# Patient Record
Sex: FEMALE | Race: WHITE | HISPANIC OR LATINO | Employment: OTHER | URBAN - METROPOLITAN AREA
[De-identification: names, ages, dates, MRNs, and addresses within clinical notes are randomized per-mention and may not be internally consistent; named-entity substitution may affect disease eponyms.]

---

## 2017-01-28 ENCOUNTER — HOSPITAL ENCOUNTER (EMERGENCY)
Facility: HOSPITAL | Age: 32
Discharge: HOME | End: 2017-01-28
Admitting: EMERGENCY MEDICINE

## 2017-01-28 ENCOUNTER — APPOINTMENT (EMERGENCY)
Dept: RADIOLOGY | Facility: HOSPITAL | Age: 32
End: 2017-01-28

## 2017-01-28 VITALS
DIASTOLIC BLOOD PRESSURE: 55 MMHG | RESPIRATION RATE: 17 BRPM | BODY MASS INDEX: 19.54 KG/M2 | SYSTOLIC BLOOD PRESSURE: 114 MMHG | HEART RATE: 103 BPM | OXYGEN SATURATION: 96 % | WEIGHT: 128.53 LBS | TEMPERATURE: 98.2 F

## 2017-01-28 DIAGNOSIS — B34.9 VIRAL SYNDROME: ICD-10-CM

## 2017-01-28 DIAGNOSIS — N39.0 UTI (URINARY TRACT INFECTION): Primary | ICD-10-CM

## 2017-01-28 LAB
BACTERIA UR QL AUTO: ABNORMAL /HPF
BILIRUB UR QL STRIP: ABNORMAL
CLARITY UR: ABNORMAL
COLOR UR: ABNORMAL
FLUAV AG SPEC QL IA: NEGATIVE
FLUBV AG SPEC QL IA: NEGATIVE
GLUCOSE UR STRIP-MCNC: NEGATIVE MG/DL
HCG UR QL: NEGATIVE
HGB UR QL STRIP.AUTO: ABNORMAL
KETONES UR STRIP-MCNC: ABNORMAL MG/DL
LEUKOCYTE ESTERASE UR QL STRIP: NEGATIVE
NITRITE UR QL STRIP: POSITIVE
NON-SQ EPI CELLS URNS QL MICRO: ABNORMAL /HPF
PH UR STRIP.AUTO: 5.5 [PH] (ref 4.5–8)
PROT UR STRIP-MCNC: ABNORMAL MG/DL
RBC #/AREA URNS AUTO: ABNORMAL /HPF
SP GR UR STRIP.AUTO: >=1.03 (ref 1–1.03)
UROBILINOGEN UR QL STRIP.AUTO: 1 E.U./DL
WBC #/AREA URNS AUTO: ABNORMAL /HPF

## 2017-01-28 PROCEDURE — 87798 DETECT AGENT NOS DNA AMP: CPT | Performed by: PHYSICIAN ASSISTANT

## 2017-01-28 PROCEDURE — 71020 HB CHEST X-RAY 2VW FRONTAL&LATL: CPT

## 2017-01-28 PROCEDURE — 87400 INFLUENZA A/B EACH AG IA: CPT | Performed by: PHYSICIAN ASSISTANT

## 2017-01-28 PROCEDURE — 87077 CULTURE AEROBIC IDENTIFY: CPT

## 2017-01-28 PROCEDURE — 87086 URINE CULTURE/COLONY COUNT: CPT

## 2017-01-28 PROCEDURE — 99283 EMERGENCY DEPT VISIT LOW MDM: CPT

## 2017-01-28 PROCEDURE — 87186 SC STD MICRODIL/AGAR DIL: CPT

## 2017-01-28 PROCEDURE — 81001 URINALYSIS AUTO W/SCOPE: CPT

## 2017-01-28 PROCEDURE — 81025 URINE PREGNANCY TEST: CPT | Performed by: PHYSICIAN ASSISTANT

## 2017-01-28 RX ORDER — CEPHALEXIN 500 MG/1
500 CAPSULE ORAL 3 TIMES DAILY
Qty: 15 CAPSULE | Refills: 0 | Status: SHIPPED | OUTPATIENT
Start: 2017-01-28 | End: 2017-02-02

## 2017-01-29 LAB
FLUAV AG SPEC QL: NORMAL
FLUBV AG SPEC QL: NORMAL
RSV B RNA SPEC QL NAA+PROBE: NORMAL

## 2017-01-30 LAB — BACTERIA UR CULT: NORMAL

## 2017-04-10 ENCOUNTER — APPOINTMENT (EMERGENCY)
Dept: CT IMAGING | Facility: HOSPITAL | Age: 32
End: 2017-04-10
Payer: COMMERCIAL

## 2017-04-10 ENCOUNTER — HOSPITAL ENCOUNTER (EMERGENCY)
Facility: HOSPITAL | Age: 32
Discharge: HOME | End: 2017-04-10
Attending: EMERGENCY MEDICINE
Payer: COMMERCIAL

## 2017-04-10 VITALS
BODY MASS INDEX: 18.64 KG/M2 | WEIGHT: 122.6 LBS | TEMPERATURE: 98.4 F | HEART RATE: 78 BPM | OXYGEN SATURATION: 99 % | DIASTOLIC BLOOD PRESSURE: 60 MMHG | RESPIRATION RATE: 16 BRPM | SYSTOLIC BLOOD PRESSURE: 103 MMHG

## 2017-04-10 DIAGNOSIS — R10.9 ABDOMINAL PAIN: Primary | ICD-10-CM

## 2017-04-10 LAB
BACTERIA UR QL AUTO: NORMAL /HPF
BILIRUB UR QL STRIP: ABNORMAL
CLARITY UR: ABNORMAL
COLOR UR: YELLOW
GLUCOSE UR STRIP-MCNC: NEGATIVE MG/DL
HCG UR QL: NEGATIVE
HGB UR QL STRIP.AUTO: NEGATIVE
KETONES UR STRIP-MCNC: NEGATIVE MG/DL
LEUKOCYTE ESTERASE UR QL STRIP: NEGATIVE
MUCOUS THREADS UR QL AUTO: NORMAL
NITRITE UR QL STRIP: NEGATIVE
NON-SQ EPI CELLS URNS QL MICRO: NORMAL /HPF
PH UR STRIP.AUTO: 8.5 [PH] (ref 4.5–8)
PROT UR STRIP-MCNC: ABNORMAL MG/DL
RBC #/AREA URNS AUTO: NORMAL /HPF
SP GR UR STRIP.AUTO: 1.02 (ref 1–1.03)
UROBILINOGEN UR QL STRIP.AUTO: 1 E.U./DL
WBC #/AREA URNS AUTO: NORMAL /HPF

## 2017-04-10 PROCEDURE — 81002 URINALYSIS NONAUTO W/O SCOPE: CPT | Performed by: EMERGENCY MEDICINE

## 2017-04-10 PROCEDURE — 81025 URINE PREGNANCY TEST: CPT | Performed by: EMERGENCY MEDICINE

## 2017-04-10 PROCEDURE — 99284 EMERGENCY DEPT VISIT MOD MDM: CPT

## 2017-04-10 PROCEDURE — 81001 URINALYSIS AUTO W/SCOPE: CPT

## 2017-04-10 RX ORDER — KETOROLAC TROMETHAMINE 30 MG/ML
15 INJECTION, SOLUTION INTRAMUSCULAR; INTRAVENOUS ONCE
Status: DISCONTINUED | OUTPATIENT
Start: 2017-04-10 | End: 2017-04-10 | Stop reason: HOSPADM

## 2017-10-18 ENCOUNTER — TRANSCRIBE ORDERS (OUTPATIENT)
Dept: LAB | Facility: CLINIC | Age: 32
End: 2017-10-18

## 2017-10-18 ENCOUNTER — GENERIC CONVERSION - ENCOUNTER (OUTPATIENT)
Dept: OTHER | Facility: OTHER | Age: 32
End: 2017-10-18

## 2017-10-18 ENCOUNTER — APPOINTMENT (OUTPATIENT)
Dept: LAB | Facility: CLINIC | Age: 32
End: 2017-10-18
Payer: COMMERCIAL

## 2017-10-18 DIAGNOSIS — N96 RECURRENT PREGNANCY LOSS (CODE): ICD-10-CM

## 2017-10-18 DIAGNOSIS — R53.82 CHRONIC FATIGUE: ICD-10-CM

## 2017-10-18 DIAGNOSIS — R63.4 ABNORMAL WEIGHT LOSS: ICD-10-CM

## 2017-10-18 DIAGNOSIS — Z34.91 ENCOUNTER FOR SUPERVISION OF NORMAL PREGNANCY IN FIRST TRIMESTER: ICD-10-CM

## 2017-10-18 LAB
25(OH)D3 SERPL-MCNC: 19.2 NG/ML (ref 30–100)
ALBUMIN SERPL BCP-MCNC: 4 G/DL (ref 3.5–5)
ALP SERPL-CCNC: 58 U/L (ref 46–116)
ALT SERPL W P-5'-P-CCNC: 17 U/L (ref 12–78)
ANION GAP SERPL CALCULATED.3IONS-SCNC: 7 MMOL/L (ref 4–13)
AST SERPL W P-5'-P-CCNC: 13 U/L (ref 5–45)
BASOPHILS # BLD AUTO: 0.02 THOUSANDS/ΜL (ref 0–0.1)
BASOPHILS NFR BLD AUTO: 0 % (ref 0–1)
BILIRUB SERPL-MCNC: 0.64 MG/DL (ref 0.2–1)
BUN SERPL-MCNC: 8 MG/DL (ref 5–25)
CALCIUM SERPL-MCNC: 9 MG/DL (ref 8.3–10.1)
CHLORIDE SERPL-SCNC: 104 MMOL/L (ref 100–108)
CHOLEST SERPL-MCNC: 139 MG/DL (ref 50–200)
CO2 SERPL-SCNC: 26 MMOL/L (ref 21–32)
CREAT SERPL-MCNC: 0.63 MG/DL (ref 0.6–1.3)
CREAT UR-MCNC: 197 MG/DL
EOSINOPHIL # BLD AUTO: 0.02 THOUSAND/ΜL (ref 0–0.61)
EOSINOPHIL NFR BLD AUTO: 0 % (ref 0–6)
ERYTHROCYTE [DISTWIDTH] IN BLOOD BY AUTOMATED COUNT: 13.3 % (ref 11.6–15.1)
GFR SERPL CREATININE-BSD FRML MDRD: 121 ML/MIN/1.73SQ M
GLUCOSE P FAST SERPL-MCNC: 89 MG/DL (ref 65–99)
HCT VFR BLD AUTO: 41 % (ref 34.8–46.1)
HDLC SERPL-MCNC: 63 MG/DL (ref 40–60)
HGB BLD-MCNC: 13.9 G/DL (ref 11.5–15.4)
LDLC SERPL CALC-MCNC: 62 MG/DL (ref 0–100)
LYMPHOCYTES # BLD AUTO: 1.11 THOUSANDS/ΜL (ref 0.6–4.47)
LYMPHOCYTES NFR BLD AUTO: 14 % (ref 14–44)
MCH RBC QN AUTO: 31.2 PG (ref 26.8–34.3)
MCHC RBC AUTO-ENTMCNC: 33.9 G/DL (ref 31.4–37.4)
MCV RBC AUTO: 92 FL (ref 82–98)
MICROALBUMIN UR-MCNC: 46.7 MG/L (ref 0–20)
MICROALBUMIN/CREAT 24H UR: 24 MG/G CREATININE (ref 0–30)
MONOCYTES # BLD AUTO: 0.55 THOUSAND/ΜL (ref 0.17–1.22)
MONOCYTES NFR BLD AUTO: 7 % (ref 4–12)
NEUTROPHILS # BLD AUTO: 6.17 THOUSANDS/ΜL (ref 1.85–7.62)
NEUTS SEG NFR BLD AUTO: 79 % (ref 43–75)
NRBC BLD AUTO-RTO: 0 /100 WBCS
PLATELET # BLD AUTO: 250 THOUSANDS/UL (ref 149–390)
PMV BLD AUTO: 10.4 FL (ref 8.9–12.7)
POTASSIUM SERPL-SCNC: 3.9 MMOL/L (ref 3.5–5.3)
PROT SERPL-MCNC: 8.3 G/DL (ref 6.4–8.2)
RBC # BLD AUTO: 4.46 MILLION/UL (ref 3.81–5.12)
SODIUM SERPL-SCNC: 137 MMOL/L (ref 136–145)
TRIGL SERPL-MCNC: 68 MG/DL
TSH SERPL DL<=0.05 MIU/L-ACNC: 0.55 UIU/ML (ref 0.36–3.74)
WBC # BLD AUTO: 7.88 THOUSAND/UL (ref 4.31–10.16)

## 2017-10-18 PROCEDURE — 82570 ASSAY OF URINE CREATININE: CPT

## 2017-10-18 PROCEDURE — 81241 F5 GENE: CPT

## 2017-10-18 PROCEDURE — 80053 COMPREHEN METABOLIC PANEL: CPT

## 2017-10-18 PROCEDURE — 82306 VITAMIN D 25 HYDROXY: CPT

## 2017-10-18 PROCEDURE — 36415 COLL VENOUS BLD VENIPUNCTURE: CPT

## 2017-10-18 PROCEDURE — 80061 LIPID PANEL: CPT

## 2017-10-18 PROCEDURE — 86430 RHEUMATOID FACTOR TEST QUAL: CPT

## 2017-10-18 PROCEDURE — 86038 ANTINUCLEAR ANTIBODIES: CPT

## 2017-10-18 PROCEDURE — 82043 UR ALBUMIN QUANTITATIVE: CPT

## 2017-10-18 PROCEDURE — 84443 ASSAY THYROID STIM HORMONE: CPT

## 2017-10-18 PROCEDURE — 85025 COMPLETE CBC W/AUTO DIFF WBC: CPT

## 2017-10-19 ENCOUNTER — GENERIC CONVERSION - ENCOUNTER (OUTPATIENT)
Dept: OTHER | Facility: OTHER | Age: 32
End: 2017-10-19

## 2017-10-19 LAB — RHEUMATOID FACT SER QL LA: NEGATIVE

## 2017-10-20 LAB — RYE IGE QN: NEGATIVE

## 2017-10-24 LAB
COMMENT: NORMAL
F5 GENE MUT ANL BLD/T: NORMAL

## 2017-11-07 ENCOUNTER — ALLSCRIPTS OFFICE VISIT (OUTPATIENT)
Dept: OTHER | Facility: OTHER | Age: 32
End: 2017-11-07

## 2017-11-07 ENCOUNTER — GENERIC CONVERSION - ENCOUNTER (OUTPATIENT)
Dept: OTHER | Facility: OTHER | Age: 32
End: 2017-11-07

## 2017-11-07 ENCOUNTER — APPOINTMENT (OUTPATIENT)
Dept: LAB | Facility: HOSPITAL | Age: 32
End: 2017-11-07
Payer: COMMERCIAL

## 2017-11-07 DIAGNOSIS — Z34.91 ENCOUNTER FOR SUPERVISION OF NORMAL PREGNANCY IN FIRST TRIMESTER: ICD-10-CM

## 2017-11-07 LAB
ABO GROUP BLD: NORMAL
BACTERIA UR QL AUTO: ABNORMAL /HPF
BASOPHILS # BLD AUTO: 0.02 THOUSANDS/ΜL (ref 0–0.1)
BASOPHILS NFR BLD AUTO: 0 % (ref 0–1)
BILIRUB UR QL STRIP: NEGATIVE
BLD GP AB SCN SERPL QL: NEGATIVE
CLARITY UR: CLEAR
COLOR UR: YELLOW
EOSINOPHIL # BLD AUTO: 0.02 THOUSAND/ΜL (ref 0–0.61)
EOSINOPHIL NFR BLD AUTO: 0 % (ref 0–6)
ERYTHROCYTE [DISTWIDTH] IN BLOOD BY AUTOMATED COUNT: 12.6 % (ref 11.6–15.1)
GLUCOSE UR STRIP-MCNC: NEGATIVE MG/DL
HCT VFR BLD AUTO: 39.1 % (ref 34.8–46.1)
HGB BLD-MCNC: 13.6 G/DL (ref 11.5–15.4)
HGB UR QL STRIP.AUTO: NEGATIVE
KETONES UR STRIP-MCNC: NEGATIVE MG/DL
LEUKOCYTE ESTERASE UR QL STRIP: NEGATIVE
LYMPHOCYTES # BLD AUTO: 1.68 THOUSANDS/ΜL (ref 0.6–4.47)
LYMPHOCYTES NFR BLD AUTO: 20 % (ref 14–44)
MCH RBC QN AUTO: 31.3 PG (ref 26.8–34.3)
MCHC RBC AUTO-ENTMCNC: 34.8 G/DL (ref 31.4–37.4)
MCV RBC AUTO: 90 FL (ref 82–98)
MONOCYTES # BLD AUTO: 0.7 THOUSAND/ΜL (ref 0.17–1.22)
MONOCYTES NFR BLD AUTO: 8 % (ref 4–12)
NEUTROPHILS # BLD AUTO: 5.89 THOUSANDS/ΜL (ref 1.85–7.62)
NEUTS SEG NFR BLD AUTO: 72 % (ref 43–75)
NITRITE UR QL STRIP: NEGATIVE
NON-SQ EPI CELLS URNS QL MICRO: ABNORMAL /HPF
NRBC BLD AUTO-RTO: 0 /100 WBCS
PH UR STRIP.AUTO: 6 [PH] (ref 4.5–8)
PLATELET # BLD AUTO: 214 THOUSANDS/UL (ref 149–390)
PMV BLD AUTO: 10.3 FL (ref 8.9–12.7)
PROT UR STRIP-MCNC: ABNORMAL MG/DL
RBC # BLD AUTO: 4.34 MILLION/UL (ref 3.81–5.12)
RBC #/AREA URNS AUTO: ABNORMAL /HPF
RH BLD: POSITIVE
SP GR UR STRIP.AUTO: >=1.03 (ref 1–1.03)
SPECIMEN EXPIRATION DATE: NORMAL
UROBILINOGEN UR QL STRIP.AUTO: 0.2 E.U./DL
WBC # BLD AUTO: 8.31 THOUSAND/UL (ref 4.31–10.16)
WBC #/AREA URNS AUTO: ABNORMAL /HPF

## 2017-11-07 PROCEDURE — 87086 URINE CULTURE/COLONY COUNT: CPT

## 2017-11-07 PROCEDURE — 87077 CULTURE AEROBIC IDENTIFY: CPT

## 2017-11-07 PROCEDURE — 81220 CFTR GENE COM VARIANTS: CPT

## 2017-11-07 PROCEDURE — 81401 MOPATH PROCEDURE LEVEL 2: CPT

## 2017-11-07 PROCEDURE — 87186 SC STD MICRODIL/AGAR DIL: CPT

## 2017-11-07 PROCEDURE — 36415 COLL VENOUS BLD VENIPUNCTURE: CPT

## 2017-11-07 PROCEDURE — 81001 URINALYSIS AUTO W/SCOPE: CPT

## 2017-11-07 PROCEDURE — 80081 OBSTETRIC PANEL INC HIV TSTG: CPT

## 2017-11-08 ENCOUNTER — GENERIC CONVERSION - ENCOUNTER (OUTPATIENT)
Dept: OTHER | Facility: OTHER | Age: 32
End: 2017-11-08

## 2017-11-08 LAB
HBV SURFACE AG SER QL: NORMAL
HIV 1+2 AB+HIV1 P24 AG SERPL QL IA: NORMAL
RPR SER QL: NORMAL
RUBV IGG SERPL IA-ACNC: 53.1 IU/ML

## 2017-11-10 ENCOUNTER — GENERIC CONVERSION - ENCOUNTER (OUTPATIENT)
Dept: OTHER | Facility: OTHER | Age: 32
End: 2017-11-10

## 2017-11-10 LAB
BACTERIA UR CULT: ABNORMAL
BACTERIA UR CULT: ABNORMAL

## 2017-11-13 ENCOUNTER — GENERIC CONVERSION - ENCOUNTER (OUTPATIENT)
Dept: OTHER | Facility: OTHER | Age: 32
End: 2017-11-13

## 2017-11-14 LAB
CF COMMENT: NORMAL
CFTR MUT ANL BLD/T: NORMAL

## 2017-11-19 LAB — MISCELLANEOUS LAB TEST RESULT: NORMAL

## 2017-11-20 ENCOUNTER — GENERIC CONVERSION - ENCOUNTER (OUTPATIENT)
Dept: OTHER | Facility: OTHER | Age: 32
End: 2017-11-20

## 2018-01-10 NOTE — RESULT NOTES
Discussion/Summary   please let pt know BW shows her thyroid is normal  In general her BW looks good  Her Vitamin D is low at 19 should be at least 30 ideally closer to 60  Take Vitamin D 2000 IU daily sent to pharmacy      Verified Results  (1) CBC/PLT/DIFF [id number] 11:39AM Bunny Been Order Number: [id number]     Test Name Result Flag Reference   WBC COUNT 7 88 Thousand/uL  4 31-10 16   RBC COUNT 4 46 Million/uL  3 81-5 12   HEMOGLOBIN 13 9 g/dL  11 5-15 4   HEMATOCRIT 41 0 %  34 8-46  1   MCV 92 fL  82-98   MCH 31 2 pg  26 8-34 3   MCHC 33 9 g/dL  31 4-37 4   RDW 13 3 %  11 6-15 1   MPV 10 4 fL  8 9-12 7   PLATELET COUNT 478 Thousands/uL  149-390   nRBC AUTOMATED 0 /100 WBCs     NEUTROPHILS RELATIVE PERCENT 79 % H 43-75   LYMPHOCYTES RELATIVE PERCENT 14 %  14-44   MONOCYTES RELATIVE PERCENT 7 %  4-12   EOSINOPHILS RELATIVE PERCENT 0 %  0-6   BASOPHILS RELATIVE PERCENT 0 %  0-1   NEUTROPHILS ABSOLUTE COUNT 6 17 Thousands/? ??L  1 85-7 62   LYMPHOCYTES ABSOLUTE COUNT 1 11 Thousands/? ??L  0 60-4 47   MONOCYTES ABSOLUTE COUNT 0 55 Thousand/? ??L  0 17-1 22   EOSINOPHILS ABSOLUTE COUNT 0 02 Thousand/? ??L  0 00-0 61   BASOPHILS ABSOLUTE COUNT 0 02 Thousands/? ??L  0 00-0 10     (1) COMPREHENSIVE METABOLIC PANEL [id number] 85:40TY Phil Liner    Order Number: [id number]     Test Name Result Flag Reference   SODIUM 137 mmol/L  136-145   POTASSIUM 3 9 mmol/L  3 5-5 3   CHLORIDE 104 mmol/L  100-108   CARBON DIOXIDE 26 mmol/L  21-32   ANION GAP (CALC) 7 mmol/L  4-13   BLOOD UREA NITROGEN 8 mg/dL  5-25   CREATININE 0 63 mg/dL  0 60-1 30   Standardized to IDMS reference method   CALCIUM 9 0 mg/dL  8 3-10 1   BILI, TOTAL 0 64 mg/dL  0 20-1 00   ALK PHOSPHATAS 58 U/L     ALT (SGPT) 17 U/L  12-78   Specimen collection should occur prior to Sulfasalazine and/or Sulfapyridine administration due to the potential for falsely depressed results     AST(SGOT) 13 U/L  5-45   Specimen collection should occur prior to Sulfasalazine administration due to the potential for falsely depressed results  ALBUMIN 4 0 g/dL  3 5-5 0   TOTAL PROTEIN 8 3 g/dL H 6 4-8 2   eGFR 121 ml/min/1 73sq m     National Kidney Disease Education Program recommendations are as follows:  GFR calculation is accurate only with a steady state creatinine  Chronic Kidney disease less than 60 ml/min/1 73 sq  meters  Kidney failure less than 15 ml/min/1 73 sq  meters  GLUCOSE FASTING 89 mg/dL  65-99   Specimen collection should occur prior to Sulfasalazine administration due to the potential for falsely depressed results  Specimen collection should occur prior to Sulfapyridine administration due to the potential for falsely elevated results  (1) LIPID PANEL FASTING W DIRECT LDL REFLEX 18Oct2017 11:39AM Phil Liner    Order Number: [id number]     Test Name Result Flag Reference   CHOLESTEROL 139 mg/dL     LDL CHOLESTEROL CALCULATED 62 mg/dL  0-100   Triglyceride:        Normal <150 mg/dl   Borderline High 150-199 mg/dl   High 200-499 mg/dl   Very High >499 mg/dl      Cholesterol:       Desirable <200 mg/dl    Borderline High 200-239 mg/dl    High >239 mg/dl      HDL Cholesterol:       High>59 mg/dL    Low <41 mg/dL      HDL Cholesterol:       High>59 mg/dL    Low <41 mg/dL      This screening LDL is a calculated result  It does not have the accuracy of the Direct Measured LDL in the monitoring of patients with hyperlipidemia and/or statin therapy  Direct Measure LDL (VNE094) must be ordered separately in these patients  TRIGLYCERIDES 68 mg/dL  <=150   Specimen collection should occur prior to N-Acetylcysteine or Metamizole administration due to the potential for falsely depressed results  HDL,DIRECT 63 mg/dL H 40-60   Specimen collection should occur prior to Metamizole administration due to the potential for falsley depressed results       (1) MICROALBUMIN CREATININE RATIO, RANDOM URINE 18Oct2017 11:39AM Tessa Aston Order Number: [id number]     Test Name Result Flag Reference   MICROALBUMIN/ CREAT R 24 mg/g creatinine  0-30   MICROALBUMIN,URINE 46 7 mg/L H 0 0-20 0   CREATININE URINE 197 0 mg/dL       (1) TSH WITH FT4 REFLEX 18Oct2017 11:39AM [facility] Order Number: [id number]     Test Name Result Flag Reference   TSH 0 552 uIU/mL  0 358-3 740   Patients undergoing fluorescein dye angiography may retain small amounts of fluorescein in the body for 48-72 hours post procedure  Samples containing fluorescein can produce falsely depressed TSH values  If the patient had this procedure,a specimen should be resubmitted post fluorescein clearance  The recommended reference ranges for TSH during pregnancy are as follows:  First trimester 0 1 to 2 5 uIU/mL  Second trimester  0 2 to 3 0 uIU/mL  Third trimester 0 3 to 3 0 uIU/m     (1) RHEUMATOID FACTOR SCREEN [id number] 11:39AM Prized    Order Number: [id number]     Test Name Result Flag Reference   RHEUMATOID FACTOR Negative  Negative     (1) VITAMIN D 25-HYDROXY [id number] 11:39AM Tessa Aston Order Number: [id number]     Test Name Result Flag Reference   VIT D 25-HYDROX 19 2 ng/mL L 30 0-100 0   This assay is a certified procedure of the CDC Vitamin D Standardization Certification Program (VDSCP)     Deficiency <20ng/ml   Insufficiency 20-30ng/ml   Sufficient  ng/ml     *Patients undergoing fluorescein dye angiography may retain small amounts of fluorescein in the body for 48-72 hours post procedure  Samples containing fluorescein can produce falsely elevated Vitamin D values  If the patient had this procedure, a specimen should be resubmitted post fluorescein clearance         Signatures   Electronically signed by : CAPO Acharya ; Oct 19 2017 10:44AM EST                       (Author)

## 2018-01-10 NOTE — MISCELLANEOUS
Reason For Visit  Reason For Visit Free Text Note Form: Received request to contact pt  to provide support/information regarding outpatient mental health resources  Call to pt  regarding same  She reports that she is interested in becoming involved with counseling  Reviewed outpatient mental health providers who are in network with her insurance  Also, mailed list of in network providers to pt  and included Social Work contact information at [facility]  Supportive counseling provided  Pt  denies further needs at this time  Will continue to be available to provide support  Active Problems    1  Anemia (285 9) (D64 9)   2  Chronic fatigue (780 79) (R53 82)   3  Depression with anxiety (300 4) (F41 8)   4  Encounter for cervical Pap smear with pelvic exam (V76 2,V72 31) (Z01 419)   5  Encounter for pregnancy related examination in first trimester (V22 1) (Z34 91)   6  History of recurrent miscarriages (N96)   7  Unintentional weight loss (783 21) (R63 4)   8  Vitamin D deficiency (268 9) (E55 9)    Current Meds   1  Iron Supplement 325 (65 Fe) MG TABS; Therapy: (([phone]) to Recorded   2  PNV Prenatal Plus Multivitamin 27-1 MG Oral Tablet; Therapy: (([phone]) to Recorded   3  Vitamin D 2000 UNIT Oral Tablet; Take 1 tablet daily; Therapy: 88BXB9812 to (Last Rx:19Oct2017)  Requested for: 65MMR4666 Ordered    Allergies    1  No Known Drug Allergies    2   Latex    Signatures   Electronically signed by : FAUSTO Gibson; Nov 8 2017 11:46AM EST                       (Author)

## 2018-01-13 NOTE — RESULT NOTES
Verified Results  (1) SPECIAL TEST [id number] 04:00PM Kendy Woodward Order Number: [id number]     Test Name Result Flag Reference   TEST RESULT SEE WRITTEN REPORT

## 2018-01-16 NOTE — MISCELLANEOUS
Message  spoke to patient on the phone, she is symptomatic of UTI  we will send antibiotics to the pharmacy  macrobid BID for 7 days  to take until completion      Active Problems    1  Anemia (285 9) (D64 9)   2  Chronic fatigue (780 79) (R53 82)   3  Depression with anxiety (300 4) (F41 8)   4  Encounter for cervical Pap smear with pelvic exam (V76 2,V72 31) (Z01 419)   5  Encounter for pregnancy related examination in first trimester (V22 1) (Z34 91)   6  History of recurrent miscarriages (N96)   7  Unintentional weight loss (783 21) (R63 4)   8  Vitamin D deficiency (268 9) (E55 9)    Current Meds   1  Iron Supplement 325 (65 Fe) MG TABS; Therapy: (70 361 207) to Recorded   2  PNV Prenatal Plus Multivitamin 27-1 MG Oral Tablet; Therapy: (87 361 207) to Recorded   3  Vitamin D 2000 UNIT Oral Tablet; Take 1 tablet daily; Therapy: 74JNH8896 to (Last Rx:19Oct2017)  Requested for: 31WZR9924 Ordered    Allergies    1  No Known Drug Allergies    2   Latex    Signatures   Electronically signed by : Khoi Bear RN; Nov 10 2017 12:56PM EST                       (Author)

## 2018-01-17 NOTE — RESULT NOTES
Verified Results  (1) PRENATAL PANEL [id number] 04:00PM Alex Avalos Order Number: [id number]     Test Name Result Flag Reference   HIV 1/2 AND P24 Non-Reactive  Non-Reactive   This test detects HIV 1, HIV2 and p24 Antigen  BILIRUBIN UA Negative  Negative   CLARITY Clear     COLOR Yellow     KETONES UA Negative mg/dl  Negative   LEUKOCYTE ESTERASE UA Negative  Negative   NITRITE UA Negative  Negative   BLOOD UA Negative  Negative, Trace-Intact   PH UA 6 0  4 5-8 0   PROTEIN UA Trace mg/dl A Negative   SPECIFIC GRAVITY UA >=1 030  1 003-1 030   GLUCOSE UA Negative mg/dl  Negative   UROBILINOGEN UA 0 2 E U /dl  0 2, 1 0 E U /dl   HEPATITIS B SURFACE ANTIGEN Non-reactive  Non-reactive, NonReactive - Confirmed   BASOPHILS ABSOLUTE COUNT 0 02 Thousands/? ??L  0 00-0 10   EOSINOPHILS ABSOLUTE COUNT 0 02 Thousand/? ??L  0 00-0 61   LYMPHOCYTES ABSOLUTE COUNT 1 68 Thousands/? ??L  0 60-4 47   MONOCYTES ABSOLUTE COUNT 0 70 Thousand/? ??L  0 17-1 22   NEUTROPHILS ABSOLUTE COUNT 5 89 Thousands/? ??L  1 85-7 62   BASOPHILS RELATIVE PERCENT 0 %  0-1   EOSINOPHILS RELATIVE PERCENT 0 %  0-6   HEMATOCRIT 39 1 %  34 8-46 1   HEMOGLOBIN 13 6 g/dL  11 5-15 4   LYMPHOCYTES RELATIVE PERCENT 20 %  14-44   MCH 31 3 pg  26 8-34 3   MCHC 34 8 g/dL  31 4-37 4   MCV 90 fL  82-98   MONOCYTES RELATIVE PERCENT 8 %  4-12   nRBC AUTOMATED 0 /100 WBCs     PLATELET COUNT 830 Thousands/uL  149-390   MPV 10 3 fL  8 9-12 7   RBC COUNT 4 34 Million/uL  3 81-5 12   RDW 12 6 %  11 6-15 1   NEUTROPHILS RELATIVE PERCENT 72 %  43-75   WBC COUNT 8 31 Thousand/uL  4 31-10 16   RUBELLA (IGG) 53 1 IU/mL  >9 9   Rubella IgG       <5 0 IU/mL     Negative: not immune      5 0-9 9 IU/mL  Equivocal     >9 9 IU/mL     Positive: immune   ABO GROUPING A     RH FACTOR Positive     ANTIBODY SCREEN Negative     RPR Non-Reactive  Non-Reactive   CLINICAL REPORT (Report) A    Test:        Urine culture  Specimen Type:   Urine  Specimen Date:   11/7/2017 4:00 PM  Result Date:    11/10/2017 9:31 AM  Result Status:   Final result  Abnormal:      Yes  Resulting Lab:    [address]            Tel: [phone]      CULTURE                                       ------------------                                   10,000-19,000 cfu/ml Escherichia coli (Abnormal)    10,000-19,000 cfu/ml Nae serna (Abnormal)      SUSCEPTIBILITY                                   ------------------                                                       Escherichia coli  METHOD                 FRANCHESKA  -------------------------------------  --------------------------  AMOXICILLIN + CLAVULANATE                 AMPICILLIN ($$)             <=8 00 ug/ml Susceptible  AMPICILLIN + SULBACTAM ($)                AZTREONAM ($$$)             <=8 ug/ml   Susceptible  CEFAZOLIN ($)              <=8 00 ug/ml Susceptible  CIPROFLOXACIN ($)            >2 00 ug/ml  Resistant  ERTAPENEM ($$$)                      GENTAMICIN ($$)             <=4 ug/ml   Susceptible  IMIPENEM                         LEVOFLOXACIN ($)            >4 00 ug/ml  Resistant  MEROPENEM ($$)                      NITROFURANTOIN             <=32 ug/ml  Susceptible  PIPERACILLIN + TAZOBACTAM ($$$)     <=16 ug/ml  Susceptible  TETRACYCLINE              <=4 ug/ml   Susceptible  TOBRAMYCIN ($)             <=4 ug/ml   Susceptible  TRIMETHOPRIM + SULFAMETHOXAZOLE ($$$)  <=2/38 ug/ml Susceptible                        Nae serna  METHOD                 FRANCHESKA  -------------------------------------  --------------------------  AMOXICILLIN + CLAVULANATE        <=8/4 ug/ml  Susceptible  AMPICILLIN ($$)             16 00 ug/ml  Intermediate  AMPICILLIN + SULBACTAM ($)       <=8/4 ug/ml  Susceptible  AZTREONAM ($$$)             <=8 ug/ml   Susceptible  CEFAZOLIN ($)              <=8 00 ug/ml Susceptible  CIPROFLOXACIN ($)            >2 00 ug/ml  Resistant  ERTAPENEM ($$$) <=2 0 ug/ml  Susceptible  GENTAMICIN ($$)             <=4 ug/ml   Susceptible  IMIPENEM                <=4 ug/ml   Susceptible  LEVOFLOXACIN ($)            4 00 ug/ml  Intermediate  MEROPENEM ($$)             <=4 00 ug/ml Susceptible  NITROFURANTOIN             <=32 ug/ml  Susceptible  PIPERACILLIN + TAZOBACTAM ($$$)     <=16 ug/ml  Susceptible  TETRACYCLINE              <=4 ug/ml   Susceptible  TOBRAMYCIN ($)             <=4 ug/ml   Susceptible  TRIMETHOPRIM + SULFAMETHOXAZOLE ($$$)  <=2/38 ug/ml Susceptible   SPECIMEN EXPIRATION DATE [id number]

## 2018-01-18 NOTE — MISCELLANEOUS
To whom it may concern,     I am writing this letter upon request of Pedro Oropeza  she is a patient at our office  She has multiple medical issues including depression  Her pet dog helps control her depression,  for  this reason she would greatly benefit in continuing to have her pet dog with her at home  Please let me know if I can be of any further assistance  Electronically signed Miles ARMENTA    Nov 10 2017  2:40PM EST

## 2018-01-22 VITALS
WEIGHT: 128 LBS | HEIGHT: 70 IN | SYSTOLIC BLOOD PRESSURE: 106 MMHG | RESPIRATION RATE: 18 BRPM | DIASTOLIC BLOOD PRESSURE: 68 MMHG | BODY MASS INDEX: 18.32 KG/M2 | HEART RATE: 108 BPM | TEMPERATURE: 98 F | OXYGEN SATURATION: 99 %

## 2018-01-22 VITALS
BODY MASS INDEX: 18.66 KG/M2 | HEIGHT: 69 IN | SYSTOLIC BLOOD PRESSURE: 111 MMHG | DIASTOLIC BLOOD PRESSURE: 73 MMHG | WEIGHT: 126 LBS | HEART RATE: 86 BPM

## 2018-05-31 NOTE — PROGRESS NOTES
Education  Baby & Me Education 1st Trimester:   First Trimester Education provided:   benefits of breastfeeding, importance of exclusive breastfeeding, early initiation of breastfeeding, exclusive breastfeeding for the first 6 months and Pregnancy Essentials Reference Guide given   The patient is planning on breastfeeding        Signatures   Electronically signed by : Emerson Loredo RN; Nov 7 2017  2:09PM EST                       (Author) Complex Repair And Ftsg Text: The defect edges were debeveled with a #15 scalpel blade.  The primary defect was closed partially with a complex linear closure.  Given the location of the defect, shape of the defect and the proximity to free margins a full thickness skin graft was deemed most appropriate to repair the remaining defect.  The graft was trimmed to fit the size of the remaining defect.  The graft was then placed in the primary defect, oriented appropriately, and sutured into place.

## 2020-11-17 ENCOUNTER — OFFICE VISIT (OUTPATIENT)
Dept: INTERNAL MEDICINE CLINIC | Facility: CLINIC | Age: 35
End: 2020-11-17
Payer: COMMERCIAL

## 2020-11-17 VITALS
DIASTOLIC BLOOD PRESSURE: 66 MMHG | BODY MASS INDEX: 23.55 KG/M2 | WEIGHT: 159 LBS | HEART RATE: 79 BPM | HEIGHT: 69 IN | OXYGEN SATURATION: 97 % | SYSTOLIC BLOOD PRESSURE: 104 MMHG | TEMPERATURE: 98.1 F

## 2020-11-17 DIAGNOSIS — N60.02 CYST OF LEFT BREAST: Primary | ICD-10-CM

## 2020-11-17 DIAGNOSIS — R07.9 CHEST PAIN, UNSPECIFIED TYPE: ICD-10-CM

## 2020-11-17 PROCEDURE — 3725F SCREEN DEPRESSION PERFORMED: CPT | Performed by: INTERNAL MEDICINE

## 2020-11-17 PROCEDURE — 3008F BODY MASS INDEX DOCD: CPT | Performed by: INTERNAL MEDICINE

## 2020-11-17 PROCEDURE — 99214 OFFICE O/P EST MOD 30 MIN: CPT | Performed by: INTERNAL MEDICINE

## 2020-11-23 ENCOUNTER — HOSPITAL ENCOUNTER (OUTPATIENT)
Dept: MAMMOGRAPHY | Facility: CLINIC | Age: 35
Discharge: HOME | End: 2020-11-23
Payer: COMMERCIAL

## 2020-12-01 ENCOUNTER — HOSPITAL ENCOUNTER (OUTPATIENT)
Dept: MAMMOGRAPHY | Facility: CLINIC | Age: 35
Discharge: HOME | End: 2020-12-01
Payer: COMMERCIAL

## 2020-12-01 ENCOUNTER — HOSPITAL ENCOUNTER (OUTPATIENT)
Dept: ULTRASOUND IMAGING | Facility: CLINIC | Age: 35
Discharge: HOME | End: 2020-12-01
Payer: COMMERCIAL

## 2020-12-01 VITALS — HEIGHT: 69 IN | WEIGHT: 159 LBS | BODY MASS INDEX: 23.55 KG/M2

## 2020-12-01 DIAGNOSIS — N60.02 CYST OF LEFT BREAST: ICD-10-CM

## 2020-12-01 PROCEDURE — 76642 ULTRASOUND BREAST LIMITED: CPT

## 2020-12-01 PROCEDURE — 77066 DX MAMMO INCL CAD BI: CPT

## 2020-12-01 PROCEDURE — G0279 TOMOSYNTHESIS, MAMMO: HCPCS

## 2020-12-29 ENCOUNTER — TELEPHONE (OUTPATIENT)
Dept: ADMINISTRATIVE | Facility: OTHER | Age: 35
End: 2020-12-29

## 2020-12-29 ENCOUNTER — OFFICE VISIT (OUTPATIENT)
Dept: INTERNAL MEDICINE CLINIC | Facility: CLINIC | Age: 35
End: 2020-12-29
Payer: COMMERCIAL

## 2020-12-29 VITALS
WEIGHT: 162.2 LBS | DIASTOLIC BLOOD PRESSURE: 66 MMHG | HEIGHT: 69 IN | HEART RATE: 88 BPM | BODY MASS INDEX: 24.02 KG/M2 | SYSTOLIC BLOOD PRESSURE: 104 MMHG | OXYGEN SATURATION: 98 % | TEMPERATURE: 99 F

## 2020-12-29 DIAGNOSIS — F32.A DEPRESSION, UNSPECIFIED DEPRESSION TYPE: ICD-10-CM

## 2020-12-29 DIAGNOSIS — N60.02 CYST OF LEFT BREAST: Primary | ICD-10-CM

## 2020-12-29 PROCEDURE — 99214 OFFICE O/P EST MOD 30 MIN: CPT | Performed by: INTERNAL MEDICINE

## 2020-12-29 PROCEDURE — 3008F BODY MASS INDEX DOCD: CPT | Performed by: INTERNAL MEDICINE

## 2020-12-29 RX ORDER — FLUOXETINE HYDROCHLORIDE 20 MG/1
20 CAPSULE ORAL DAILY
Qty: 30 CAPSULE | Refills: 2 | Status: SHIPPED | OUTPATIENT
Start: 2020-12-29 | End: 2021-04-23 | Stop reason: SDUPTHER

## 2020-12-29 RX ORDER — IBUPROFEN 200 MG
400 TABLET ORAL 2 TIMES DAILY PRN
COMMUNITY
End: 2021-10-13

## 2020-12-29 RX ORDER — FLUOXETINE HYDROCHLORIDE 20 MG/1
20 CAPSULE ORAL DAILY
Qty: 30 CAPSULE | Refills: 2 | Status: CANCELLED | OUTPATIENT
Start: 2020-12-29

## 2021-01-22 ENCOUNTER — TELEPHONE (OUTPATIENT)
Dept: INTERNAL MEDICINE CLINIC | Facility: CLINIC | Age: 36
End: 2021-01-22

## 2021-01-22 NOTE — TELEPHONE ENCOUNTER
Patient not listed with our office as PCP for insurance  Need to call patient with information and inform prior to her next appointment in February she will need to contact the insurance company and update her PCP  Patient has [address]  Our Group #:  [id number]  Dr Olivier Marin:  [phone]    Called patient and informed of numbers  Patient stated she will call tomorrow and get this updated so it will be ready for her next appointment on 2/10/2021 with Dr Gabby Driver

## 2021-04-23 ENCOUNTER — TELEPHONE (OUTPATIENT)
Dept: INTERNAL MEDICINE CLINIC | Facility: CLINIC | Age: 36
End: 2021-04-23

## 2021-04-23 ENCOUNTER — TELEMEDICINE (OUTPATIENT)
Dept: INTERNAL MEDICINE CLINIC | Facility: CLINIC | Age: 36
End: 2021-04-23
Payer: COMMERCIAL

## 2021-04-23 VITALS — WEIGHT: 160 LBS | BODY MASS INDEX: 23.7 KG/M2 | HEIGHT: 69 IN

## 2021-04-23 DIAGNOSIS — F32.A DEPRESSION, UNSPECIFIED DEPRESSION TYPE: ICD-10-CM

## 2021-04-23 PROCEDURE — 3008F BODY MASS INDEX DOCD: CPT | Performed by: NURSE PRACTITIONER

## 2021-04-23 PROCEDURE — 99214 OFFICE O/P EST MOD 30 MIN: CPT | Performed by: NURSE PRACTITIONER

## 2021-04-23 PROCEDURE — 3725F SCREEN DEPRESSION PERFORMED: CPT | Performed by: NURSE PRACTITIONER

## 2021-04-23 RX ORDER — FLUOXETINE HYDROCHLORIDE 20 MG/1
20 CAPSULE ORAL DAILY
Qty: 90 CAPSULE | Refills: 1 | Status: SHIPPED | OUTPATIENT
Start: 2021-04-23 | End: 2021-04-29 | Stop reason: SDUPTHER

## 2021-04-23 NOTE — TELEPHONE ENCOUNTER
Patient called requesting a refill of fluoxetine 20 mg, take 1 tablet daily  Her OV on 2/20 ws cancelled and rescheduled on 3/24/21 at which time she was a no show  I LM stating that she needs to call the office to reschedule her appt before the refill can be processed

## 2021-04-23 NOTE — ASSESSMENT & PLAN NOTE
Controlled with Prozac  Will get updated bloodwork  Recommend psychology  Will follow up in 4-6 months with PCP

## 2021-04-23 NOTE — PROGRESS NOTES
Virtual Regular Visit      Assessment/Plan:    Problem List Items Addressed This Visit        Other    Depression     Controlled with Prozac  Will get updated bloodwork  Recommend psychology  Will follow up in 4-6 months with PCP  Relevant Medications    FLUoxetine (PROzac) 20 mg capsule    Other Relevant Orders    Comprehensive metabolic panel    CBC and differential    Lipid panel    TSH, 3rd generation with Free T4 reflex               Reason for visit is   Chief Complaint   Patient presents with    Medication Refill        Encounter provider CHELY Dupree    Provider located at [address] [address]      Recent Visits  No visits were found meeting these conditions  Showing recent visits within past 7 days and meeting all other requirements     Today's Visits  Date Type Provider Dept   04/23/21 Óscar Amaya 89 M  CHELY Dan [facility]   04/23/21 Telephone Driscilla Babinski, RN [facility]   Showing today's visits and meeting all other requirements     Future Appointments  No visits were found meeting these conditions  Showing future appointments within next 150 days and meeting all other requirements        The patient was identified by name and date of birth  Henna Chan was informed that this is a telemedicine visit and that the visit is being conducted through [address] [place] [address] and patient was informed that this is not a secure, HIPAA-compliant platform  She agrees to proceed     My office door was closed  No one else was in the room  She acknowledged consent and understanding of privacy and security of the video platform  The patient has agreed to participate and understands they can discontinue the visit at any time  Patient is aware this is a billable service       Ana Chen is a 31 y o  female   Patient presents today to follow up on Depression/Anxiety  Patient currently taking Prozac 20mg tablet  Depression/anxiety- Prozac causes heat sweats which has improved, and reports trouble sleeping at times, she reports that the medication seems to be working and feels her anxiety and depression has improved, she reports panic  Attacks about once a week  She reports that her  has noted an improvement  Past Medical History:   Diagnosis Date    Anemia     Anxiety     Asthma     Depression     Migraine     Migraine headache     PTSD (post-traumatic stress disorder)        Past Surgical History:   Procedure Laterality Date     SECTION         Current Outpatient Medications   Medication Sig Dispense Refill    FLUoxetine (PROzac) 20 mg capsule Take 1 capsule (20 mg total) by mouth daily 90 capsule 1    ibuprofen (MOTRIN) 200 mg tablet Take 400 mg by mouth 2 (two) times a day as needed for mild pain       No current facility-administered medications for this visit  Allergies   Allergen Reactions    Latex Hives and Throat Swelling       Review of Systems   Constitutional: Negative for activity change, appetite change, chills, diaphoresis and fever  HENT: Negative for congestion, ear discharge, ear pain, postnasal drip, rhinorrhea, sinus pressure, sinus pain and sore throat  Eyes: Negative for pain, discharge, itching and visual disturbance  Respiratory: Negative for cough, chest tightness, shortness of breath and wheezing  Cardiovascular: Negative for chest pain, palpitations and leg swelling  Gastrointestinal: Negative for abdominal pain, constipation, diarrhea, nausea and vomiting  Endocrine: Negative for polydipsia, polyphagia and polyuria  Genitourinary: Negative for difficulty urinating, dysuria and urgency  Musculoskeletal: Negative for arthralgias, back pain and neck pain  Skin: Negative for rash and wound     Neurological: Negative for dizziness, weakness, numbness and headaches  Psychiatric/Behavioral: Positive for dysphoric mood and sleep disturbance  The patient is nervous/anxious  Video Exam    Vitals:    04/23/21 1112   Weight: 72 6 kg (160 lb)   Height: 5' 9" (1 753 m)       Physical Exam  Constitutional:       General: She is not in acute distress  HENT:      Mouth/Throat:      Pharynx: No oropharyngeal exudate  Eyes:      General: No scleral icterus  Pulmonary:      Effort: No respiratory distress  Neurological:      Mental Status: She is alert and oriented to person, place, and time  Psychiatric:         Behavior: Behavior normal          Thought Content: Thought content normal          Judgment: Judgment normal           I spent 15 minutes directly with the patient during this visit      [address] acknowledges that she has consented to an online visit or consultation  She understands that the online visit is based solely on information provided by her, and that, in the absence of a face-to-face physical evaluation by the physician, the diagnosis she receives is both limited and provisional in terms of accuracy and completeness  This is not intended to replace a full medical face-to-face evaluation by the physician  Oremile Dunaway understands and accepts these terms

## 2021-04-29 DIAGNOSIS — F32.A DEPRESSION, UNSPECIFIED DEPRESSION TYPE: ICD-10-CM

## 2021-04-29 RX ORDER — FLUOXETINE HYDROCHLORIDE 20 MG/1
20 CAPSULE ORAL DAILY
Qty: 90 CAPSULE | Refills: 1 | Status: SHIPPED | OUTPATIENT
Start: 2021-04-29 | End: 2021-09-22 | Stop reason: SDUPTHER

## 2021-06-25 ENCOUNTER — TELEPHONE (OUTPATIENT)
Dept: INTERNAL MEDICINE CLINIC | Facility: CLINIC | Age: 36
End: 2021-06-25

## 2021-09-20 ENCOUNTER — TELEPHONE (OUTPATIENT)
Dept: INTERNAL MEDICINE CLINIC | Facility: CLINIC | Age: 36
End: 2021-09-20

## 2021-09-20 NOTE — TELEPHONE ENCOUNTER
This medication can be taken in the morning or the evening  She can try taking it in the morning, but I would still wait until her appt tomorrow to discuss further, because you dont want to take it at 10pm last night and then again this morning  She could hold off on taking it tonight and take it tomorrow morning instead, but its up to her if she wants to wait until her appt tomorrow

## 2021-09-20 NOTE — TELEPHONE ENCOUNTER
Patient called back, relayed information  Patient verbalized understanding and will wait until tomorrow to speak with Dr Yohana Rojas further on this  ,

## 2021-09-20 NOTE — TELEPHONE ENCOUNTER
Patient called and stated that she recently had an anxiety attack and wants to know what she can take for this to help calm her down  She takes her FLUoxetine (PROzac) 20 mg capsule at 10pm each night so it doesn't really help because she is sleeping  Can she take this earlier when this happens? She does an appt tomorrow to follow up with medication dosing  Please advise

## 2021-09-22 ENCOUNTER — OFFICE VISIT (OUTPATIENT)
Dept: INTERNAL MEDICINE CLINIC | Facility: CLINIC | Age: 36
End: 2021-09-22
Payer: COMMERCIAL

## 2021-09-22 VITALS
SYSTOLIC BLOOD PRESSURE: 100 MMHG | DIASTOLIC BLOOD PRESSURE: 70 MMHG | BODY MASS INDEX: 24.97 KG/M2 | OXYGEN SATURATION: 98 % | WEIGHT: 168.6 LBS | HEIGHT: 69 IN | TEMPERATURE: 98 F | HEART RATE: 84 BPM

## 2021-09-22 DIAGNOSIS — Z11.59 NEED FOR HEPATITIS C SCREENING TEST: Primary | ICD-10-CM

## 2021-09-22 DIAGNOSIS — N92.0 MENORRHAGIA WITH REGULAR CYCLE: ICD-10-CM

## 2021-09-22 DIAGNOSIS — F32.A DEPRESSION, UNSPECIFIED DEPRESSION TYPE: ICD-10-CM

## 2021-09-22 DIAGNOSIS — R53.83 FATIGUE, UNSPECIFIED TYPE: ICD-10-CM

## 2021-09-22 DIAGNOSIS — Z13.1 SCREENING FOR DIABETES MELLITUS: ICD-10-CM

## 2021-09-22 PROCEDURE — 99214 OFFICE O/P EST MOD 30 MIN: CPT | Performed by: INTERNAL MEDICINE

## 2021-09-22 RX ORDER — MULTIVITAMIN
1 TABLET ORAL DAILY
COMMUNITY
End: 2021-10-13

## 2021-09-22 RX ORDER — FLUOXETINE HYDROCHLORIDE 40 MG/1
40 CAPSULE ORAL DAILY
Qty: 90 CAPSULE | Refills: 1 | Status: SHIPPED | OUTPATIENT
Start: 2021-09-22 | End: 2021-09-29

## 2021-09-22 NOTE — PATIENT INSTRUCTIONS
Increase fluoxetine to 40 mg daily which is equal and to 20 mg tablet  Prescription is called in for 40 mg if the 40 mg seems to help go ahead and get the prescription filled   Get blood work done before follow-up  Referral given to counselor  Check insurance to see which services are covered    Follow-up with gyn

## 2021-09-22 NOTE — PROGRESS NOTES
Assessment/Plan:    Increase fluoxetine to 40 mg daily which is equal and to 20 mg tablet  Prescription is called in for 40 mg if the 40 mg seems to help go ahead and get the prescription filled   Get blood work done before follow-up  Referral given to counselor  Check insurance to see which services are covered  Follow-up with gyn     Telma was seen today for follow-up  Diagnoses and all orders for this visit:    Need for hepatitis C screening test  -     Hepatitis C antibody; Future    Depression, unspecified depression type  -     FLUoxetine (PROzac) 40 MG capsule; Take 1 capsule (40 mg total) by mouth daily  -     Comprehensive metabolic panel; Future    Fatigue, unspecified type  -     Hepatitis C antibody; Future  -     CBC and differential; Future  -     TSH, 3rd generation with Free T4 reflex; Future    Screening for diabetes mellitus  -     Hemoglobin A1C; Future    Menorrhagia with regular cycle  -     Iron Panel (Includes Ferritin, Iron Sat%, Iron, and TIBC); Future                Diagnoses and all orders for this visit:    Need for hepatitis C screening test  -     Hepatitis C antibody; Future    Depression, unspecified depression type  -     FLUoxetine (PROzac) 20 mg capsule; Take 1 capsule (20 mg total) by mouth daily    Other orders  -     Multiple Vitamin (multivitamin) tablet; Take 1 tablet by mouth daily          Subjective:   Chief Complaint   Patient presents with    Follow-up     5 m f/u visit for depression  She is currently vaping medical marijuana that is helping her anxiety  Her son was recently diagnosed with autism  She did not go for labs  Patient ID: Thee Hendricks is a 31 y o  female  Depression  This is a recurrent problem  The current episode started more than 1 month ago  The problem has been gradually worsening  Associated symptoms include fatigue  The symptoms are aggravated by stress  The treatment provided mild relief     Fatigue  This is a recurrent problem  The current episode started more than 1 year ago  The problem occurs constantly  The problem has been gradually worsening  Associated symptoms include fatigue  She has tried rest and lying down (vaping) for the symptoms  The treatment provided mild relief  Vaginal Bleeding  The patient's primary symptoms include vaginal bleeding  This is a recurrent problem  The current episode started more than 1 month ago  She is not pregnant  The vaginal bleeding is typical of menses  She has been passing clots  She is sexually active  No, her partner does not have an STD  Her menstrual history has been regular  Her past medical history is significant for menorrhagia and metrorrhagia  The following portions of the patient's history were reviewed and updated as appropriate: past family history, past medical history, past social history, past surgical history and problem list     Review of Systems   Constitutional: Positive for fatigue  Genitourinary: Positive for menorrhagia and vaginal bleeding  Psychiatric/Behavioral: Positive for depression  All other systems reviewed and are negative          Past Medical History:   Diagnosis Date    Anemia     Anxiety     Asthma     Depression     Migraine     Migraine headache     PTSD (post-traumatic stress disorder)          Current Outpatient Medications:     FLUoxetine (PROzac) 20 mg capsule, Take 1 capsule (20 mg total) by mouth daily, Disp: 90 capsule, Rfl: 1    ibuprofen (MOTRIN) 200 mg tablet, Take 400 mg by mouth 2 (two) times a day as needed for mild pain, Disp: , Rfl:     Multiple Vitamin (multivitamin) tablet, Take 1 tablet by mouth daily, Disp: , Rfl:     Allergies   Allergen Reactions    Latex Hives and Throat Swelling       Social History   Past Surgical History:   Procedure Laterality Date     SECTION       Family History   Problem Relation Age of Onset    Ovarian cancer Mother     Breast cancer Mother 44    Ovarian cancer Sister  Leukemia Sister     Lung cancer Sister     No Known Problems Father     No Known Problems Maternal Grandmother     No Known Problems Maternal Grandfather     No Known Problems Paternal Grandmother     No Known Problems Paternal Grandfather     No Known Problems Paternal Aunt     No Known Problems Paternal Aunt     No Known Problems Paternal Aunt     No Known Problems Paternal Aunt     No Known Problems Paternal Aunt     No Known Problems Paternal Aunt     No Known Problems Paternal Aunt        Objective:  /70   Pulse 84   Temp 98 °F (36 7 °C) (Tympanic)   Ht 5' 9 13" (1 756 m)   Wt 76 5 kg (168 lb 9 6 oz)   SpO2 98%   BMI 24 80 kg/m²     No results found for this or any previous visit (from the past 1344 hour(s))  Physical Exam      Gen: NAD  Heent: atraumatic, normocephalic  Mouth: is moist  Neck: is supple, no JVD, no carotid bruits     Heart: is regular Normal s1, s2,  Lungs: are clear to auscultation  Abd: soft, non tender, NABS  Ext: no edema, distal pulses normal  Skin: no rashes, ulcers  Mood: normal affect  Neuro: AAOx3

## 2021-09-22 NOTE — PROGRESS NOTES
Depression Screening Follow-up Plan: Patient's depression screening was positive with a PHQ-2 score of 2  Their PHQ-9 score was 11  Patient assessed for underlying major depression  They have no active suicidal ideations  Brief counseling provided and recommend additional follow-up/re-evaluation next office visit  Continue regular follow-up with their psychologist/therapist/psychiatrist who is managing their mental health condition(s)  Patient's depressive symptoms likely due to other medical condition  Would recommend treatment of underlying condition  Will continue to monitor at next office visit

## 2021-09-23 ENCOUNTER — APPOINTMENT (OUTPATIENT)
Dept: LAB | Facility: CLINIC | Age: 36
End: 2021-09-23
Payer: COMMERCIAL

## 2021-09-23 DIAGNOSIS — R53.83 FATIGUE, UNSPECIFIED TYPE: ICD-10-CM

## 2021-09-23 DIAGNOSIS — Z11.59 NEED FOR HEPATITIS C SCREENING TEST: ICD-10-CM

## 2021-09-23 DIAGNOSIS — N92.0 MENORRHAGIA WITH REGULAR CYCLE: ICD-10-CM

## 2021-09-23 DIAGNOSIS — F32.A DEPRESSION, UNSPECIFIED DEPRESSION TYPE: ICD-10-CM

## 2021-09-23 DIAGNOSIS — Z13.1 SCREENING FOR DIABETES MELLITUS: ICD-10-CM

## 2021-09-23 LAB
ALBUMIN SERPL BCP-MCNC: 3.7 G/DL (ref 3.5–5)
ALP SERPL-CCNC: 69 U/L (ref 46–116)
ALT SERPL W P-5'-P-CCNC: 20 U/L (ref 12–78)
ANION GAP SERPL CALCULATED.3IONS-SCNC: 2 MMOL/L (ref 4–13)
AST SERPL W P-5'-P-CCNC: 15 U/L (ref 5–45)
BASOPHILS # BLD AUTO: 0.02 THOUSANDS/ΜL (ref 0–0.1)
BASOPHILS NFR BLD AUTO: 0 % (ref 0–1)
BILIRUB SERPL-MCNC: 0.43 MG/DL (ref 0.2–1)
BUN SERPL-MCNC: 10 MG/DL (ref 5–25)
CALCIUM SERPL-MCNC: 8.7 MG/DL (ref 8.3–10.1)
CHLORIDE SERPL-SCNC: 108 MMOL/L (ref 100–108)
CHOLEST SERPL-MCNC: 150 MG/DL (ref 50–200)
CO2 SERPL-SCNC: 28 MMOL/L (ref 21–32)
CREAT SERPL-MCNC: 0.72 MG/DL (ref 0.6–1.3)
EOSINOPHIL # BLD AUTO: 0.03 THOUSAND/ΜL (ref 0–0.61)
EOSINOPHIL NFR BLD AUTO: 1 % (ref 0–6)
ERYTHROCYTE [DISTWIDTH] IN BLOOD BY AUTOMATED COUNT: 13.3 % (ref 11.6–15.1)
EST. AVERAGE GLUCOSE BLD GHB EST-MCNC: 103 MG/DL
FERRITIN SERPL-MCNC: 7 NG/ML (ref 8–388)
GFR SERPL CREATININE-BSD FRML MDRD: 110 ML/MIN/1.73SQ M
GLUCOSE P FAST SERPL-MCNC: 98 MG/DL (ref 65–99)
HBA1C MFR BLD: 5.2 %
HCT VFR BLD AUTO: 39.6 % (ref 34.8–46.1)
HCV AB SER QL: NORMAL
HDLC SERPL-MCNC: 54 MG/DL
HGB BLD-MCNC: 12.6 G/DL (ref 11.5–15.4)
IMM GRANULOCYTES # BLD AUTO: 0.01 THOUSAND/UL (ref 0–0.2)
IMM GRANULOCYTES NFR BLD AUTO: 0 % (ref 0–2)
IRON SATN MFR SERPL: 18 % (ref 15–50)
IRON SERPL-MCNC: 74 UG/DL (ref 50–170)
LDLC SERPL CALC-MCNC: 83 MG/DL (ref 0–100)
LYMPHOCYTES # BLD AUTO: 1.06 THOUSANDS/ΜL (ref 0.6–4.47)
LYMPHOCYTES NFR BLD AUTO: 19 % (ref 14–44)
MCH RBC QN AUTO: 29.6 PG (ref 26.8–34.3)
MCHC RBC AUTO-ENTMCNC: 31.8 G/DL (ref 31.4–37.4)
MCV RBC AUTO: 93 FL (ref 82–98)
MONOCYTES # BLD AUTO: 0.44 THOUSAND/ΜL (ref 0.17–1.22)
MONOCYTES NFR BLD AUTO: 8 % (ref 4–12)
NEUTROPHILS # BLD AUTO: 4.18 THOUSANDS/ΜL (ref 1.85–7.62)
NEUTS SEG NFR BLD AUTO: 72 % (ref 43–75)
NONHDLC SERPL-MCNC: 96 MG/DL
NRBC BLD AUTO-RTO: 0 /100 WBCS
PLATELET # BLD AUTO: 235 THOUSANDS/UL (ref 149–390)
PMV BLD AUTO: 10.3 FL (ref 8.9–12.7)
POTASSIUM SERPL-SCNC: 3.7 MMOL/L (ref 3.5–5.3)
PROT SERPL-MCNC: 8 G/DL (ref 6.4–8.2)
RBC # BLD AUTO: 4.25 MILLION/UL (ref 3.81–5.12)
SODIUM SERPL-SCNC: 138 MMOL/L (ref 136–145)
TIBC SERPL-MCNC: 407 UG/DL (ref 250–450)
TRIGL SERPL-MCNC: 63 MG/DL
TSH SERPL DL<=0.05 MIU/L-ACNC: 0.71 UIU/ML (ref 0.36–3.74)
WBC # BLD AUTO: 5.74 THOUSAND/UL (ref 4.31–10.16)

## 2021-09-23 PROCEDURE — 82728 ASSAY OF FERRITIN: CPT

## 2021-09-23 PROCEDURE — 80053 COMPREHEN METABOLIC PANEL: CPT

## 2021-09-23 PROCEDURE — 83540 ASSAY OF IRON: CPT

## 2021-09-23 PROCEDURE — 80061 LIPID PANEL: CPT | Performed by: NURSE PRACTITIONER

## 2021-09-23 PROCEDURE — 84443 ASSAY THYROID STIM HORMONE: CPT | Performed by: NURSE PRACTITIONER

## 2021-09-23 PROCEDURE — 83550 IRON BINDING TEST: CPT

## 2021-09-23 PROCEDURE — 85025 COMPLETE CBC W/AUTO DIFF WBC: CPT | Performed by: NURSE PRACTITIONER

## 2021-09-23 PROCEDURE — 86803 HEPATITIS C AB TEST: CPT

## 2021-09-23 PROCEDURE — 83036 HEMOGLOBIN GLYCOSYLATED A1C: CPT

## 2021-09-23 PROCEDURE — 36415 COLL VENOUS BLD VENIPUNCTURE: CPT | Performed by: NURSE PRACTITIONER

## 2021-09-24 ENCOUNTER — TELEPHONE (OUTPATIENT)
Dept: INTERNAL MEDICINE CLINIC | Age: 36
End: 2021-09-24

## 2021-09-24 NOTE — TELEPHONE ENCOUNTER
----- Message from Ramón Smith MD sent at 9/23/2021  9:06 PM EDT -----  Regarding: JAZLYN  Please tell patient to continue her vitamins and increase the iron to 2 x daily ( OTC), she has iron deficiency  That explains her fatigue      MA

## 2021-09-29 ENCOUNTER — OFFICE VISIT (OUTPATIENT)
Dept: INTERNAL MEDICINE CLINIC | Facility: CLINIC | Age: 36
End: 2021-09-29
Payer: COMMERCIAL

## 2021-09-29 VITALS
DIASTOLIC BLOOD PRESSURE: 66 MMHG | BODY MASS INDEX: 24.85 KG/M2 | SYSTOLIC BLOOD PRESSURE: 102 MMHG | OXYGEN SATURATION: 98 % | TEMPERATURE: 98.6 F | HEIGHT: 69 IN | HEART RATE: 75 BPM | WEIGHT: 167.8 LBS

## 2021-09-29 DIAGNOSIS — L70.0 CYSTIC ACNE: ICD-10-CM

## 2021-09-29 DIAGNOSIS — F32.A DEPRESSION, UNSPECIFIED DEPRESSION TYPE: ICD-10-CM

## 2021-09-29 DIAGNOSIS — D50.0 IRON DEFICIENCY ANEMIA DUE TO CHRONIC BLOOD LOSS: Primary | ICD-10-CM

## 2021-09-29 PROCEDURE — 99214 OFFICE O/P EST MOD 30 MIN: CPT | Performed by: INTERNAL MEDICINE

## 2021-09-29 RX ORDER — FLUOXETINE HYDROCHLORIDE 20 MG/1
20 CAPSULE ORAL DAILY
Qty: 90 CAPSULE | Refills: 1
Start: 2021-09-29 | End: 2021-11-12 | Stop reason: SDUPTHER

## 2021-09-29 RX ORDER — TETRACYCLINE HYDROCHLORIDE 500 MG/1
500 CAPSULE ORAL DAILY
Qty: 30 CAPSULE | Refills: 0 | Status: SHIPPED | OUTPATIENT
Start: 2021-09-29 | End: 2021-10-03

## 2021-09-29 NOTE — PROGRESS NOTES
Assessment/Plan:    Iron supplementation 1 a day with food  Start tetracycline after 1 week of taking iron  Do not take with milk  Continue fluoxetine 20 milligrams a day  I spent 25 minutes directly with the patient during this visit  today in which greater than 50% of this time was spent in counseling/coordination of care regarding Diagnostic results, Prognosis, Risks and benefits of tx options, Intructions for management, Patient and family education, Importance of tx compliance, Risk factor reductions and Impressions    Depression  Fair control of situational depression  Symptoms of fatigue are related to anemia    Cystic acne  Discussed the causes, evaluation and treatment options for acne  Discussed general skin care issues as they relate to acne treatment  Started oral antibiotics per med orders  Iron deficiency anemia due to chronic blood loss      Assessment/Plan   Iron deficiency anemia due to Blood loss    The diagnosis was discussed with the patient  Hematinics as prescribed              Diagnoses and all orders for this visit:    Iron deficiency anemia due to chronic blood loss  -     ferrous [id number] C-folic acid (FOLTRIN) capsule; Take 1 capsule by mouth daily    Depression, unspecified depression type  -     FLUoxetine (PROzac) 20 mg capsule; Take 1 capsule (20 mg total) by mouth daily    Cystic acne  -     tetracycline (ACHROMYCIN,SUMYCIN) 500 MG capsule; Take 1 capsule (500 mg total) by mouth daily          Subjective:   Chief Complaint   Patient presents with    Follow-up     1 w f/u visit for chronic conditions, review labs from 9/23/21  Patient ID: Anjel Sagastume is a 31 y o  female  Subjective : ANEMIA  Telma Schulte is a 31 y o  female who presents for evaluation of anemia  Anemia was found by symptoms of fatigue and lab work  It has been present for several months  Associated signs & symptoms: fatigue and pallor        Fatigue  This is a chronic 200 mg tablet, Take 400 mg by mouth 2 (two) times a day as needed for mild pain, Disp: , Rfl:     Multiple Vitamin (multivitamin) tablet, Take 1 tablet by mouth daily, Disp: , Rfl:     ferrous [id number] C-folic acid (FOLTRIN) capsule, Take 1 capsule by mouth daily, Disp: 90 capsule, Rfl: 1    tetracycline (ACHROMYCIN,SUMYCIN) 500 MG capsule, Take 1 capsule (500 mg total) by mouth daily, Disp: 30 capsule, Rfl: 0    Allergies   Allergen Reactions    Latex Hives and Throat Swelling       Social History   Past Surgical History:   Procedure Laterality Date     SECTION       Family History   Problem Relation Age of Onset    Ovarian cancer Mother     Breast cancer Mother 44    Ovarian cancer Sister     Leukemia Sister     Lung cancer Sister     No Known Problems Father     No Known Problems Maternal Grandmother     No Known Problems Maternal Grandfather     No Known Problems Paternal Grandmother     No Known Problems Paternal Grandfather     No Known Problems Paternal Aunt     No Known Problems Paternal Aunt     No Known Problems Paternal Aunt     No Known Problems Paternal Aunt     No Known Problems Paternal Aunt     No Known Problems Paternal Aunt     No Known Problems Paternal Aunt        Objective:  /66   Pulse 75   Temp 98 6 °F (37 °C) (Tympanic)   Ht 5' 9 06" (1 754 m)   Wt 76 1 kg (167 lb 12 8 oz)   SpO2 98%   BMI 24 74 kg/m²     Recent Results (from the past 1344 hour(s))   CBC and differential    Collection Time: 21  9:48 AM   Result Value Ref Range    WBC 5 74 4 31 - 10 16 Thousand/uL    RBC 4 25 3 81 - 5 12 Million/uL    Hemoglobin 12 6 11 5 - 15 4 g/dL    Hematocrit 39 6 34 8 - 46 1 %    MCV 93 82 - 98 fL    MCH 29 6 26 8 - 34 3 pg    MCHC 31 8 31 4 - 37 4 g/dL    RDW 13 3 11 6 - 15 1 %    MPV 10 3 8 9 - 12 7 fL    Platelets 237 687 - 417 Thousands/uL    nRBC 0 /100 WBCs    Neutrophils Relative 72 43 - 75 %    Immat GRANS % 0 0 - 2 %    Lymphocytes Relative 19 14 - 44 %    Monocytes Relative 8 4 - 12 %    Eosinophils Relative 1 0 - 6 %    Basophils Relative 0 0 - 1 %    Neutrophils Absolute 4 18 1 85 - 7 62 Thousands/µL    Immature Grans Absolute 0 01 0 00 - 0 20 Thousand/uL    Lymphocytes Absolute 1 06 0 60 - 4 47 Thousands/µL    Monocytes Absolute 0 44 0 17 - 1 22 Thousand/µL    Eosinophils Absolute 0 03 0 00 - 0 61 Thousand/µL    Basophils Absolute 0 02 0 00 - 0 10 Thousands/µL   Lipid panel    Collection Time: 09/23/21  9:48 AM   Result Value Ref Range    Cholesterol 150 50 - 200 mg/dL    Triglycerides 63 <=150 mg/dL    HDL, Direct 54 >=40 mg/dL    LDL Calculated 83 0 - 100 mg/dL    Non-HDL-Chol (CHOL-HDL) 96 mg/dl   TSH, 3rd generation with Free T4 reflex    Collection Time: 09/23/21  9:48 AM   Result Value Ref Range    TSH 3RD GENERATON 0 708 0 358 - 3 740 uIU/mL   Hepatitis C antibody    Collection Time: 09/23/21  9:48 AM   Result Value Ref Range    Hepatitis C Ab Non-reactive Non-reactive   Hemoglobin A1C    Collection Time: 09/23/21  9:48 AM   Result Value Ref Range    Hemoglobin A1C 5 2 Normal 3 8-5 6%; PreDiabetic 5 7-6 4%;  Diabetic >=6 5%; Glycemic control for adults with diabetes <7 0% %     mg/dl   Comprehensive metabolic panel    Collection Time: 09/23/21  9:48 AM   Result Value Ref Range    Sodium 138 136 - 145 mmol/L    Potassium 3 7 3 5 - 5 3 mmol/L    Chloride 108 100 - 108 mmol/L    CO2 28 21 - 32 mmol/L    ANION GAP 2 (L) 4 - 13 mmol/L    BUN 10 5 - 25 mg/dL    Creatinine 0 72 0 60 - 1 30 mg/dL    Glucose, Fasting 98 65 - 99 mg/dL    Calcium 8 7 8 3 - 10 1 mg/dL    AST 15 5 - 45 U/L    ALT 20 12 - 78 U/L    Alkaline Phosphatase 69 46 - 116 U/L    Total Protein 8 0 6 4 - 8 2 g/dL    Albumin 3 7 3 5 - 5 0 g/dL    Total Bilirubin 0 43 0 20 - 1 00 mg/dL    eGFR 110 ml/min/1 73sq m   Iron Saturation %    Collection Time: 09/23/21  9:48 AM   Result Value Ref Range    Iron Saturation 18 15 - 50 %    TIBC 407 250 - 450 ug/dL    Iron 74 50 - 170 ug/dL   Ferritin    Collection Time: 09/23/21  9:48 AM   Result Value Ref Range    Ferritin 7 (L) 8 - 388 ng/mL            Physical Exam      Gen: NAD  Heent: atraumatic, normocephalic  Mouth: is moist  Neck: is supple, no JVD, no carotid bruits     Heart: is regular Normal s1, s2,  Lungs: are clear to auscultation  Abd: soft, non tender, NABS  Ext: no edema, distal pulses normal  Skin: cystic acne both cheeks with underlying erythema  Mood: normal affect  Neuro: AAOx3

## 2021-09-29 NOTE — PATIENT INSTRUCTIONS
Iron supplementation 1 a day with food  Start tetracycline after 1 week of taking iron    Do not take with milk  Continue fluoxetine 20 milligrams a day

## 2021-09-30 ENCOUNTER — TELEPHONE (OUTPATIENT)
Dept: INTERNAL MEDICINE CLINIC | Facility: CLINIC | Age: 36
End: 2021-09-30

## 2021-09-30 DIAGNOSIS — L70.0 CYSTIC ACNE: Primary | ICD-10-CM

## 2021-09-30 PROBLEM — R07.9 CHEST PAIN: Status: RESOLVED | Noted: 2020-11-17 | Resolved: 2021-09-30

## 2021-09-30 PROBLEM — D50.0 IRON DEFICIENCY ANEMIA DUE TO CHRONIC BLOOD LOSS: Status: ACTIVE | Noted: 2021-09-30

## 2021-09-30 NOTE — TELEPHONE ENCOUNTER
Patient went to  prescriptions from yesterday and was informed 2 of them require prior authorization    Tertracycline  Fotrin    Patient stated the pharmacy told her our office was already informed of the need for the authorization  Patient calling to make sure we have that information  Please call patient with outcome ([phone])  Patient was informed it can take several days for approval based on insurance turn around, patient understood

## 2021-09-30 NOTE — ASSESSMENT & PLAN NOTE
Assessment/Plan   Iron deficiency anemia due to Blood loss    The diagnosis was discussed with the patient    Hematinics as prescribed

## 2021-09-30 NOTE — ASSESSMENT & PLAN NOTE
Discussed the causes, evaluation and treatment options for acne  Discussed general skin care issues as they relate to acne treatment  Started oral antibiotics per med orders

## 2021-10-01 NOTE — TELEPHONE ENCOUNTER
Patient called this morning looking to get an update on the prior authorization  relayed information was sent to insurance, waiting to hear back

## 2021-10-01 NOTE — TELEPHONE ENCOUNTER
Tetracycline 500 mg denied by insurance  Patient needs to try and fail 2 preferred drugs:    Generic Vibramycin capsules  Generic Periostat tablets  Generic Vibra-Tabs tablets  Generic Monodox capsules  Generic Adoxa capsules  Minocycline capsules    Patient would like a new RX sent to [facility]  Ferocon capsules were also denied by insurance  Ferrex 150 Forte, HUMPPILA, Iferex 150 forte and Tandem Dual Action are covered  Patient wants to start Iron as soon as possible and will self pay Ferocon capsules

## 2021-10-03 RX ORDER — MINOCYCLINE HYDROCHLORIDE 100 MG/1
100 TABLET ORAL DAILY
Qty: 30 TABLET | Refills: 0 | Status: SHIPPED | OUTPATIENT
Start: 2021-10-03 | End: 2021-10-08

## 2021-10-04 ENCOUNTER — TELEPHONE (OUTPATIENT)
Dept: INTERNAL MEDICINE CLINIC | Age: 36
End: 2021-10-04

## 2021-10-04 NOTE — TELEPHONE ENCOUNTER
Sorry, I just saw this   OK to give excuse note #1  I will send in the appropriate antibiotic    Do not take it at the same time as the iron  She can use the face gel in addition if she would like

## 2021-10-08 ENCOUNTER — TELEPHONE (OUTPATIENT)
Dept: OTHER | Facility: OTHER | Age: 36
End: 2021-10-08

## 2021-10-08 DIAGNOSIS — L70.0 CYSTIC ACNE: Primary | ICD-10-CM

## 2021-10-08 RX ORDER — MINOCYCLINE HYDROCHLORIDE 100 MG/1
100 CAPSULE ORAL DAILY
Qty: 30 CAPSULE | Refills: 0 | Status: SHIPPED | OUTPATIENT
Start: 2021-10-08 | End: 2021-11-07

## 2021-10-13 ENCOUNTER — TELEMEDICINE (OUTPATIENT)
Dept: INTERNAL MEDICINE CLINIC | Age: 36
End: 2021-10-13
Payer: COMMERCIAL

## 2021-10-13 VITALS — HEIGHT: 69 IN | TEMPERATURE: 97.8 F | BODY MASS INDEX: 24.73 KG/M2 | WEIGHT: 167 LBS

## 2021-10-13 DIAGNOSIS — R19.7 DIARRHEA OF PRESUMED INFECTIOUS ORIGIN: Primary | ICD-10-CM

## 2021-10-13 PROCEDURE — 99213 OFFICE O/P EST LOW 20 MIN: CPT | Performed by: PHYSICIAN ASSISTANT

## 2021-10-26 ENCOUNTER — TELEPHONE (OUTPATIENT)
Dept: INTERNAL MEDICINE CLINIC | Age: 36
End: 2021-10-26

## 2021-11-12 ENCOUNTER — PATIENT MESSAGE (OUTPATIENT)
Dept: INTERNAL MEDICINE CLINIC | Facility: CLINIC | Age: 36
End: 2021-11-12

## 2021-11-12 DIAGNOSIS — F32.A DEPRESSION, UNSPECIFIED DEPRESSION TYPE: ICD-10-CM

## 2021-11-12 DIAGNOSIS — L70.0 CYSTIC ACNE: Primary | ICD-10-CM

## 2021-11-13 RX ORDER — MINOCYCLINE HYDROCHLORIDE 100 MG/1
100 CAPSULE ORAL DAILY
Qty: 30 CAPSULE | Refills: 1 | Status: SHIPPED | OUTPATIENT
Start: 2021-11-13 | End: 2021-12-13

## 2021-11-15 RX ORDER — FLUOXETINE HYDROCHLORIDE 20 MG/1
20 CAPSULE ORAL DAILY
Qty: 90 CAPSULE | Refills: 0
Start: 2021-11-15 | End: 2022-01-05 | Stop reason: SDUPTHER

## 2021-11-26 ENCOUNTER — PATIENT MESSAGE (OUTPATIENT)
Dept: INTERNAL MEDICINE CLINIC | Facility: CLINIC | Age: 36
End: 2021-11-26

## 2021-12-13 ENCOUNTER — TELEPHONE (OUTPATIENT)
Dept: OBGYN CLINIC | Facility: CLINIC | Age: 36
End: 2021-12-13

## 2022-01-05 ENCOUNTER — OFFICE VISIT (OUTPATIENT)
Dept: INTERNAL MEDICINE CLINIC | Facility: CLINIC | Age: 37
End: 2022-01-05
Payer: COMMERCIAL

## 2022-01-05 VITALS
TEMPERATURE: 98.7 F | HEIGHT: 71 IN | HEART RATE: 81 BPM | DIASTOLIC BLOOD PRESSURE: 80 MMHG | OXYGEN SATURATION: 98 % | BODY MASS INDEX: 23.04 KG/M2 | WEIGHT: 164.6 LBS | SYSTOLIC BLOOD PRESSURE: 110 MMHG

## 2022-01-05 DIAGNOSIS — D50.0 IRON DEFICIENCY ANEMIA DUE TO CHRONIC BLOOD LOSS: ICD-10-CM

## 2022-01-05 DIAGNOSIS — F32.A DEPRESSION, UNSPECIFIED DEPRESSION TYPE: Primary | ICD-10-CM

## 2022-01-05 DIAGNOSIS — L70.0 CYSTIC ACNE: ICD-10-CM

## 2022-01-05 PROCEDURE — 99214 OFFICE O/P EST MOD 30 MIN: CPT | Performed by: INTERNAL MEDICINE

## 2022-01-05 RX ORDER — FLUOXETINE HYDROCHLORIDE 20 MG/1
20 CAPSULE ORAL DAILY
Qty: 90 CAPSULE | Refills: 1
Start: 2022-01-05 | End: 2022-01-25 | Stop reason: SDUPTHER

## 2022-01-05 RX ORDER — FLUOXETINE HYDROCHLORIDE 20 MG/1
40 CAPSULE ORAL DAILY
Qty: 90 CAPSULE | Refills: 0
Start: 2022-01-05 | End: 2022-01-05 | Stop reason: SDUPTHER

## 2022-01-05 NOTE — PROGRESS NOTES
Assessment/Plan:    Iron deficiency anemia due to chronic blood loss  Iron supplementation OTC  Depression  Fairly well controlled on present management  Decrease Prozac to 20 mg daily to help with the shakes/ hand tremor    Cystic acne  Much improved  Almost completely cleared              Diagnoses and all orders for this visit:    Depression, unspecified depression type  -     Discontinue: FLUoxetine (PROzac) 20 mg capsule; Take 2 capsules (40 mg total) by mouth daily  -     FLUoxetine (PROzac) 20 mg capsule; Take 1 capsule (20 mg total) by mouth daily    Iron deficiency anemia due to chronic blood loss    Cystic acne          Subjective:   Chief Complaint   Patient presents with    Follow-up     follow up on chronic medical conditions  pt has been getting shakey pt not feeling anxious      Patient ID: Yanni Johnson is a 31 y o  female  Anxiety  Presents for follow-up visit  Symptoms include compulsions, excessive worry, nervous/anxious behavior and restlessness  Primary symptoms comment: Symptoms have much improved with the use of fluoxetine and additional CBD oil that she uses twice a day during the day  The severity of symptoms is causing significant distress and interfering with daily activities  The quality of sleep is fair  Her past medical history is significant for anemia  Compliance with medications is %  Treatment side effects: fine tremor of both hands  Other  Chronicity: Acne  The problem has been resolved  Associated symptoms include weakness  The treatment provided significant relief  Anemia  Presents for follow-up visit  Symptoms include malaise/fatigue  Signs of blood loss that are present include menorrhagia  Compliance problems include medication cost   Treatment side effects: Prescribed medication was not available on formulary, patient will try the OTC medication instead            The following portions of the patient's history were reviewed and updated as appropriate: past family history, past medical history, past social history, past surgical history and problem list     Review of Systems   Constitutional: Positive for malaise/fatigue  Genitourinary: Positive for menorrhagia and menstrual problem  Skin:        Acne   Allergic/Immunologic: Positive for environmental allergies  Neurological: Positive for weakness  Psychiatric/Behavioral: The patient is nervous/anxious  All other systems reviewed and are negative          Past Medical History:   Diagnosis Date    Anemia     Anxiety     Asthma     Depression     Migraine     Migraine headache     PTSD (post-traumatic stress disorder)          Current Outpatient Medications:     FLUoxetine (PROzac) 20 mg capsule, Take 1 capsule (20 mg total) by mouth daily, Disp: 90 capsule, Rfl: 1    ferrous [id number] C-folic acid (FOLTRIN) capsule, Take 1 capsule by mouth daily (Patient not taking: Reported on 10/13/2021), Disp: 90 capsule, Rfl: 1    Allergies   Allergen Reactions    Latex Hives and Throat Swelling       Social History   Past Surgical History:   Procedure Laterality Date     SECTION       Family History   Problem Relation Age of Onset    Ovarian cancer Mother     Breast cancer Mother 44    Ovarian cancer Sister     Leukemia Sister     Lung cancer Sister     No Known Problems Father     No Known Problems Maternal Grandmother     No Known Problems Maternal Grandfather     No Known Problems Paternal Grandmother     No Known Problems Paternal Grandfather     No Known Problems Paternal Aunt     No Known Problems Paternal Aunt     No Known Problems Paternal Aunt     No Known Problems Paternal Aunt     No Known Problems Paternal Aunt     No Known Problems Paternal Aunt     No Known Problems Paternal Aunt        Objective:  /80 (BP Location: Left arm, Patient Position: Sitting, Cuff Size: Standard)   Pulse 81   Temp 98 7 °F (37 1 °C) (Tympanic)   Ht 5' 10 67" (1 795 m)   Wt 74 7 kg (164 lb 9 6 oz)   SpO2 98%   BMI 23 17 kg/m²     No results found for this or any previous visit (from the past 1344 hour(s))  Physical Exam      Gen: NAD  Heent: atraumatic, normocephalic  Mouth: is moist  Neck: is supple, no JVD, no carotid bruits     Heart: is regular Normal s1, s2,  Lungs: are clear to auscultation    Ext: no edema, distal pulses normal  Skin: acne improving  Mood: normal affect  Neuro: AAOx3

## 2022-01-05 NOTE — ASSESSMENT & PLAN NOTE
Fairly well controlled on present management    Decrease Prozac to 20 mg daily to help with the shakes/ hand tremor

## 2022-01-25 DIAGNOSIS — F32.A DEPRESSION, UNSPECIFIED DEPRESSION TYPE: ICD-10-CM

## 2022-01-25 RX ORDER — FLUOXETINE HYDROCHLORIDE 20 MG/1
20 CAPSULE ORAL DAILY
Qty: 90 CAPSULE | Refills: 1 | Status: SHIPPED | OUTPATIENT
Start: 2022-01-25

## 2022-01-25 NOTE — TELEPHONE ENCOUNTER
Medication Class was NO PRINT on 1/5/2022  Will resend to Pharmacy with corrected class       Thank you

## 2022-08-17 ENCOUNTER — OFFICE VISIT (OUTPATIENT)
Dept: INTERNAL MEDICINE CLINIC | Facility: CLINIC | Age: 37
End: 2022-08-17
Payer: COMMERCIAL

## 2022-08-17 VITALS
HEART RATE: 81 BPM | WEIGHT: 173.2 LBS | OXYGEN SATURATION: 99 % | TEMPERATURE: 98.3 F | BODY MASS INDEX: 25.65 KG/M2 | HEIGHT: 69 IN | DIASTOLIC BLOOD PRESSURE: 72 MMHG | SYSTOLIC BLOOD PRESSURE: 120 MMHG

## 2022-08-17 DIAGNOSIS — R10.9 ACUTE RIGHT FLANK PAIN: ICD-10-CM

## 2022-08-17 DIAGNOSIS — R35.0 URINARY FREQUENCY: Primary | ICD-10-CM

## 2022-08-17 LAB
SL AMB  POCT GLUCOSE, UA: NEGATIVE
SL AMB LEUKOCYTE ESTERASE,UA: NORMAL
SL AMB POCT BILIRUBIN,UA: NORMAL
SL AMB POCT BLOOD,UA: NORMAL
SL AMB POCT CLARITY,UA: NORMAL
SL AMB POCT COLOR,UA: NORMAL
SL AMB POCT KETONES,UA: 15
SL AMB POCT NITRITE,UA: NEGATIVE
SL AMB POCT PH,UA: 5.5
SL AMB POCT SPECIFIC GRAVITY,UA: >=1.03
SL AMB POCT URINE PROTEIN: 100
SL AMB POCT UROBILINOGEN: NORMAL

## 2022-08-17 PROCEDURE — 99214 OFFICE O/P EST MOD 30 MIN: CPT | Performed by: NURSE PRACTITIONER

## 2022-08-17 PROCEDURE — 81003 URINALYSIS AUTO W/O SCOPE: CPT | Performed by: NURSE PRACTITIONER

## 2022-08-17 RX ORDER — LEVOFLOXACIN 500 MG/1
500 TABLET, FILM COATED ORAL EVERY 24 HOURS
Qty: 5 TABLET | Refills: 0 | Status: SHIPPED | OUTPATIENT
Start: 2022-08-17 | End: 2022-08-22

## 2022-08-17 NOTE — ASSESSMENT & PLAN NOTE
- ongoing right flank pain with associated urinary frequency and mild dysuria s/p 10 days on bactrim   - urine dip in office shows moderate bili, ketones, large blood (pt is on menstrual cycle) protein, negative nitrites and positive large leuks  - discussed case with Dr Snider Cancer, will treat with 5 days of levaquin   - check CT renal stone analysis   - follow up with PCP  - drink plenty of water

## 2022-08-17 NOTE — PROGRESS NOTES
Assessment/Plan:    Problem List Items Addressed This Visit        Other    Acute right flank pain     - ongoing right flank pain with associated urinary frequency and mild dysuria s/p 10 days on bactrim   - urine dip in office shows moderate bili, ketones, large blood (pt is on menstrual cycle) protein, negative nitrites and positive large leuks  - discussed case with Dr Julia Knight, will treat with 5 days of levaquin   - check CT renal stone analysis   - follow up with PCP  - drink plenty of water          Relevant Medications    levofloxacin (LEVAQUIN) 500 mg tablet    Other Relevant Orders    CT renal stone study abdomen pelvis wo contrast      Other Visit Diagnoses     Urinary frequency    -  Primary    Relevant Medications    levofloxacin (LEVAQUIN) 500 mg tablet    Other Relevant Orders    POCT urine dip auto non-scope (Completed)            M*Modal software was used to dictate this note  It may contain errors with dictating incorrect words or incorrect spelling  Please contact the provider directly with any questions  Subjective:      Patient ID: Sri Price is a 30 y o  female  HPI     Patient presents today with concern for     She was seen at [facility] first on 8/6 with concern for UTI  She states she was right flank, urinary frequency, and bladder pressure and dysuria  No hematuria  She was treated with Bactrim DS x 10 days  She feels that since completing the antibiotic her symptoms are improved but not resolved  She states while on the abx she started with clear vaginal discharge  No itching  She started her menstrual cycle yesterday and does have some cramping  She continues with urinary frequency and bladder pressure when voiding       The following portions of the patient's history were reviewed and updated as appropriate: allergies, current medications, past family history, past medical history, past social history, past surgical history and problem list     Review of Systems Constitutional: Negative for chills and fever  Gastrointestinal: Negative for abdominal pain, nausea and vomiting  Genitourinary: Positive for flank pain (right sided ), frequency and pelvic pain (pressure with voiding)  Negative for difficulty urinating and dysuria           Past Medical History:   Diagnosis Date    Anemia     Anxiety     Asthma     Depression     Migraine     Migraine headache     PTSD (post-traumatic stress disorder)          Current Outpatient Medications:     levofloxacin (LEVAQUIN) 500 mg tablet, Take 1 tablet (500 mg total) by mouth every 24 hours for 5 days, Disp: 5 tablet, Rfl: 0    ferrous [id number] C-folic acid (FOLTRIN) capsule, Take 1 capsule by mouth daily (Patient not taking: No sig reported), Disp: 90 capsule, Rfl: 1    FLUoxetine (PROzac) 20 mg capsule, Take 1 capsule (20 mg total) by mouth daily (Patient not taking: Reported on 2022), Disp: 90 capsule, Rfl: 1    Allergies   Allergen Reactions    Latex Hives and Throat Swelling       Social History   Past Surgical History:   Procedure Laterality Date     SECTION       Family History   Problem Relation Age of Onset    Ovarian cancer Mother     Breast cancer Mother 44    Ovarian cancer Sister     Leukemia Sister     Lung cancer Sister     No Known Problems Father     No Known Problems Maternal Grandmother     No Known Problems Maternal Grandfather     No Known Problems Paternal Grandmother     No Known Problems Paternal Grandfather     No Known Problems Paternal Aunt     No Known Problems Paternal Aunt     No Known Problems Paternal Aunt     No Known Problems Paternal Aunt     No Known Problems Paternal Aunt     No Known Problems Paternal Aunt     No Known Problems Paternal Aunt        Objective:  /72 (BP Location: Left arm, Patient Position: Sitting, Cuff Size: Standard)   Pulse 81   Temp 98 3 °F (36 8 °C) (Temporal)   Ht 5' 9 09" (1 755 m)   Wt 78 6 kg (173 lb 3 2 oz)   SpO2 99%   BMI 25 51 kg/m²      Physical Exam  Vitals reviewed  Constitutional:       General: She is not in acute distress  Appearance: Normal appearance  She is not diaphoretic  HENT:      Head: Normocephalic and atraumatic  Eyes:      Extraocular Movements: Extraocular movements intact  Conjunctiva/sclera: Conjunctivae normal       Pupils: Pupils are equal, round, and reactive to light  Cardiovascular:      Rate and Rhythm: Normal rate and regular rhythm  Heart sounds: Normal heart sounds  No murmur heard  Pulmonary:      Effort: Pulmonary effort is normal  No respiratory distress  Breath sounds: Normal breath sounds  No wheezing, rhonchi or rales  Abdominal:      General: Bowel sounds are normal       Palpations: Abdomen is soft  Tenderness: There is abdominal tenderness (generalized)  There is right CVA tenderness  There is no left CVA tenderness  Skin:     General: Skin is warm and dry  Neurological:      Mental Status: She is alert and oriented to person, place, and time  Mental status is at baseline     Psychiatric:         Mood and Affect: Mood normal          Behavior: Behavior normal

## 2022-08-17 NOTE — PATIENT INSTRUCTIONS
Start levaquin once daily x 5 days  Drink plenty of water  Go for CT scan   Follow up with Dr Joann Waterman

## 2022-08-22 ENCOUNTER — TELEPHONE (OUTPATIENT)
Dept: INTERNAL MEDICINE CLINIC | Facility: CLINIC | Age: 37
End: 2022-08-22

## 2022-08-22 ENCOUNTER — TELEPHONE (OUTPATIENT)
Dept: INTERNAL MEDICINE CLINIC | Facility: OTHER | Age: 37
End: 2022-08-22

## 2022-08-22 DIAGNOSIS — N92.1 MENORRHAGIA WITH IRREGULAR CYCLE: ICD-10-CM

## 2022-08-22 DIAGNOSIS — R35.0 URINARY FREQUENCY: ICD-10-CM

## 2022-08-22 DIAGNOSIS — N94.6 DYSMENORRHEA: ICD-10-CM

## 2022-08-22 DIAGNOSIS — R10.9 ACUTE RIGHT FLANK PAIN: Primary | ICD-10-CM

## 2022-08-22 NOTE — TELEPHONE ENCOUNTER
Tiffany from Pre encounter dept calling regarding ct renal stone study authorization, looks like peer to peer is needed  Pt is scheduled for test tomorrow so they need to hear back asap

## 2022-08-23 NOTE — TELEPHONE ENCOUNTER
Spoke with patient, Her Ultrasound is scheduled for 8/24/22 however, she stated it is waiting to be approved by her insurance, if someone can look into this  CT scan will be cancelled and her next follow up will be with Carly Richardson on 9/7/22

## 2022-08-23 NOTE — TELEPHONE ENCOUNTER
Ana Spencer is typically out of the office on Mondays and Tuesday so things like this need to be sent to the provider bin to be addressed in a timely manner  I need more information, I need the number to call to do the peer to peer and if an appointment can be set up over my lunch break that would be great  Or if they are are to recommend alternatives to the imagining or what they are missing in order for it to be approved

## 2022-08-23 NOTE — TELEPHONE ENCOUNTER
Spoke with patient  Patient has heavy menstrual cycle, dysmenorrhea lower abdominal pain and dyspareunia    Will order pelvic ultrasound for possible fibroids/ ovarian cyst

## 2022-08-24 ENCOUNTER — HOSPITAL ENCOUNTER (OUTPATIENT)
Dept: ULTRASOUND IMAGING | Facility: HOSPITAL | Age: 37
Discharge: HOME | End: 2022-08-24
Attending: INTERNAL MEDICINE
Payer: COMMERCIAL

## 2022-08-24 ENCOUNTER — APPOINTMENT (OUTPATIENT)
Dept: CT IMAGING | Facility: HOSPITAL | Age: 37
End: 2022-08-24
Payer: COMMERCIAL

## 2022-08-24 DIAGNOSIS — N94.6 DYSMENORRHEA: ICD-10-CM

## 2022-08-24 DIAGNOSIS — R10.9 ACUTE RIGHT FLANK PAIN: ICD-10-CM

## 2022-08-24 DIAGNOSIS — N92.1 MENORRHAGIA WITH IRREGULAR CYCLE: ICD-10-CM

## 2022-08-24 DIAGNOSIS — R35.0 URINARY FREQUENCY: ICD-10-CM

## 2022-08-24 PROCEDURE — 76856 US EXAM PELVIC COMPLETE: CPT

## 2022-08-24 PROCEDURE — 76830 TRANSVAGINAL US NON-OB: CPT

## 2022-08-24 NOTE — TELEPHONE ENCOUNTER
CT scan has been canceled, and patient has scheduled Pelvis US for 8/24/22  I apologized for the late respond,  I sent a message to [phone] privately in regards to this

## 2022-08-31 ENCOUNTER — TELEPHONE (OUTPATIENT)
Dept: INTERNAL MEDICINE CLINIC | Facility: OTHER | Age: 37
End: 2022-08-31

## 2022-08-31 DIAGNOSIS — R10.9 ACUTE RIGHT FLANK PAIN: Primary | ICD-10-CM

## 2022-08-31 NOTE — TELEPHONE ENCOUNTER
Gabby Gomez from [address] called and informed our office pt has significant findings on US  Informed Dr Gardenia Loyd immediately

## 2022-08-31 NOTE — TELEPHONE ENCOUNTER
Patient was called and informed about the uterine polyps and vascularity of endometrium as seen on ultrasound  Recommended follow-up with gyn for persistent symptoms and severe dysmenorrhea along with menorrhagia leading to iron deficiency anemia  Additionally she also has recurrent UTI and right-sided flank pain for which a renal CT scan has been ordered

## 2022-09-02 ENCOUNTER — OFFICE VISIT (OUTPATIENT)
Dept: OBGYN CLINIC | Facility: CLINIC | Age: 37
End: 2022-09-02
Payer: COMMERCIAL

## 2022-09-02 VITALS
DIASTOLIC BLOOD PRESSURE: 84 MMHG | SYSTOLIC BLOOD PRESSURE: 114 MMHG | BODY MASS INDEX: 26.51 KG/M2 | HEIGHT: 69 IN | WEIGHT: 179 LBS

## 2022-09-02 DIAGNOSIS — Z80.3 FAMILY HISTORY OF BREAST CANCER: ICD-10-CM

## 2022-09-02 DIAGNOSIS — R93.89 ABNORMAL PELVIC ULTRASOUND: Primary | ICD-10-CM

## 2022-09-02 DIAGNOSIS — Z80.41 FAMILY HISTORY OF OVARIAN CANCER: ICD-10-CM

## 2022-09-02 DIAGNOSIS — N92.0 MENORRHAGIA WITH REGULAR CYCLE: ICD-10-CM

## 2022-09-02 DIAGNOSIS — N84.0 ENDOMETRIAL POLYP: ICD-10-CM

## 2022-09-02 PROBLEM — F41.8 DEPRESSION WITH ANXIETY: Status: ACTIVE | Noted: 2017-10-18

## 2022-09-02 PROBLEM — R76.8 HSV-2 SEROPOSITIVE: Status: ACTIVE | Noted: 2018-01-17

## 2022-09-02 PROBLEM — D64.9 ANEMIA: Status: ACTIVE | Noted: 2017-11-07

## 2022-09-02 PROCEDURE — 99204 OFFICE O/P NEW MOD 45 MIN: CPT | Performed by: OBSTETRICS & GYNECOLOGY

## 2022-09-02 NOTE — PROGRESS NOTES
NEW PATIENT    Patient is a 30 y o  Luz Maria Sport with Patient's last menstrual period was 2022 (approximate)  who presents requesting evaluation of abnormal ultrasound as done by PCP  She also reports heavy menses  Pt reports she was last seen by a gyn 4 years ago for her pp visit after her  with her child  She reports her menses after delivery were very heavy and are very painful  She reports she cannot wear tampons because she leaks around them  She has not sought any management of the same  She reports about 1 month ago she developed low back and pressure while urinating  She thought she had a UTI  She didn't see anyone for evaluation  She reports she knows she is not pregnant because she has not been sexually active for 3-4 months  [facility]  She was given abx for a UTI and told them they would call her if her abx needed to be changed  She reports that saw her pcp because she didn't feel better  She was given a different antibiotic  She states her back pain went away, but she continues to have pelvic pressure and pain  She reports her pcp ordered a pelvic u/s, but she was unable to tolerate the vaginal portion of the ultrasound due to pain  Pt reports her ultrasound was abnormal and was told to follow up here  [facility]--ucx with <10,000 colonies of bacteria, not further identified  UA at PCP shows large blood and large bacteria  Negative Leukestrace noted  Pelvic ultrasound results reviewed by me--2 endometrial polyps noted  We reviewed that this may or may not be the source of her heavy menses  We did discuss if she would like them to be removed that would require surgical management with hysteroscopy  Pt reports she is interested in this and will discuss further with her  and return to discuss details if she desires to proceed      With regards to heavy menses we did review contraceptive options that could help with this as she indicates that she does not use contraceptives but does not desire pregnancy at all  Reviewed LARCs specifically due to patient desires for high efficacy  Pt most interested in mirena due to bleeding profile benefits  Will place after polypectomy  Past Medical History:   Diagnosis Date    Abnormal Pap smear of cervix     2018-+HPV;    Anemia     Anxiety     Asthma     Chlamydia infection     treated in     Depression     Migraine     Migraine headache     PTSD (post-traumatic stress disorder)        Past Surgical History:   Procedure Laterality Date     SECTION  2018       OB History    Para Term  AB Living   5 1 0 1 4 1   SAB IAB Ectopic Multiple Live Births   4       1      # Outcome Date GA Lbr Ty/2nd Weight Sex Delivery Anes PTL Lv   5  18 35w4d  2830 g (6 lb 3 8 oz) M CS-LTranv EPI  SERGO   4 SAB 2017              Birth Comments: no surgery    3 SAB 2015              Birth Comments: no surgery    2 2013              Birth Comments: no surgery    1 2013              Birth Comments: no surgery       Obstetric Comments   Menarche: 10      Menses: -6/overnight pad every 2 hours x 4 days and then maxi pad every 4 hours       Gyn HX:      No current outpatient medications on file      Allergies   Allergen Reactions    Latex Hives and Throat Swelling       Social History     Socioeconomic History    Marital status: /Civil Union     Spouse name: Beto Srivastava Number of children: 1    Years of education: None    Highest education level: High school graduate   Occupational History    Occupation:    Tobacco Use    Smoking status: Former Smoker     Years:      Types: Cigarettes     Start date: 2017     Quit date: 2018     Years since quittin 6    Smokeless tobacco: Never Used    Tobacco comment: 6 cigarettes per week   Vaping Use    Vaping Use: Former    Substances: THC (oil)   Substance and Sexual Activity    Alcohol use: Not Currently     Alcohol/week: 0 0 - 1 0 standard drinks     Comment: 1 glass of wine monthly    Drug use: Yes     Types: Marijuana     Comment: medical marijuana    Sexual activity: Not Currently     Partners: Male     Comment: lifetime partners: 5;     Other Topics Concern    None   Social History Narrative    V[employer]: [employer]     Social Determinants of Health     Financial Resource Strain: Not on file   Food Insecurity: Not on file   Transportation Needs: Not on file   Physical Activity: Not on file   Stress: Not on file   Social Connections: Not on file   Intimate Partner Violence: Not on file   Housing Stability: Not on file       Family History   Problem Relation Age of Onset    Ovarian cancer Mother 36    Breast cancer Mother 44    BRCA 1/2 Mother         positive, unsure if BRCA 1 or 2    Cirrhosis Father     Hepatitis Father         unsure of which type    Ovarian cancer Sister 16        BRCA negative    Leukemia Sister 8    Lung cancer Sister 12    Fibromyalgia Sister     Asthma Sister     Diabetes type II Brother     Diabetes type II Brother     No Known Problems Brother     Colon cancer Brother          age 47    Multiple sclerosis Brother     No Known Problems Maternal Grandmother     No Known Problems Maternal Grandfather     No Known Problems Paternal Grandmother     No Known Problems Paternal Grandfather     No Known Problems Paternal Aunt     No Known Problems Paternal Aunt     No Known Problems Paternal Aunt     No Known Problems Paternal Aunt     No Known Problems Paternal Aunt     No Known Problems Paternal Aunt     No Known Problems Paternal Aunt        Review of Systems   Constitutional: Negative for chills, fatigue, fever and unexpected weight change  HENT: Negative for congestion, mouth sores and sore throat      Respiratory: Negative for cough, chest tightness, shortness of breath and wheezing  Cardiovascular: Negative for chest pain and palpitations  Gastrointestinal: Negative for abdominal distention, abdominal pain, constipation, diarrhea, nausea and vomiting  Endocrine: Negative for cold intolerance and heat intolerance  Genitourinary: Positive for menstrual problem and pelvic pain (pelvic pressure)  Negative for dyspareunia, dysuria (improved after antibiotics), genital sores, vaginal bleeding, vaginal discharge and vaginal pain  Musculoskeletal: Negative for arthralgias  Skin: Negative for color change and rash  Neurological: Negative for dizziness, light-headedness and headaches  Hematological: Negative for adenopathy  Blood pressure 114/84, height 5' 9" (1 753 m), weight 81 2 kg (179 lb), last menstrual period 08/18/2022  and Body mass index is 26 43 kg/m²  Physical Exam  Constitutional:       General: She is not in acute distress  Appearance: Normal appearance  She is well-developed and normal weight  She is not ill-appearing  HENT:      Head: Normocephalic and atraumatic  Eyes:      Extraocular Movements: Extraocular movements intact  Conjunctiva/sclera: Conjunctivae normal    Neck:      Thyroid: No thyromegaly  Trachea: No tracheal deviation  Cardiovascular:      Rate and Rhythm: Normal rate and regular rhythm  Heart sounds: Normal heart sounds  Pulmonary:      Effort: Pulmonary effort is normal  No respiratory distress  Breath sounds: Normal breath sounds  No stridor  No wheezing or rales  Abdominal:      General: Bowel sounds are normal  There is no distension  Palpations: Abdomen is soft  There is no mass  Tenderness: There is no abdominal tenderness  There is no guarding or rebound  Hernia: No hernia is present  Musculoskeletal:         General: No tenderness  Normal range of motion  Cervical back: Normal range of motion and neck supple     Lymphadenopathy:      Cervical: No cervical adenopathy  Skin:     General: Skin is warm  Findings: No erythema or rash  Neurological:      Mental Status: She is alert and oriented to person, place, and time  Psychiatric:         Mood and Affect: Mood normal          Behavior: Behavior normal          Thought Content: Thought content normal          Judgment: Judgment normal           vulva: normal external genitalia for age and no lesions, masses, epithelial changes, or exudate  vagina: color pink and rugae  well formed rugae  cervix: nullip and no lesions   uterus: NSSC, AF, NT, mobile  adnexa: no masses or tenderness      A/P:  Pt is a 30 y o   with      Telma was seen today for consult  Diagnoses and all orders for this visit:    Abnormal pelvic ultrasound  Findings reviewed with patient--2 polyps  Pt most likely will undergo D&C, hysteroscopy, polypectomy--will discuss with family    Endometrial polyp  -as above    Menorrhagia with regular cycle  -will plan Mirena for management     Family history of ovarian cancer  significant family history noted on review of patient's history--advised genetic counselor to which she is agreeable    -     Ambulatory Referral to Oncology Genetics; Future    Family history of breast cancer  -     Ambulatory Referral to Oncology Genetics;  Future

## 2022-09-06 ENCOUNTER — TELEPHONE (OUTPATIENT)
Dept: GENETICS | Facility: CLINIC | Age: 37
End: 2022-09-06

## 2022-09-06 ENCOUNTER — VBI (OUTPATIENT)
Dept: ADMINISTRATIVE | Facility: OTHER | Age: 37
End: 2022-09-06

## 2022-09-06 NOTE — TELEPHONE ENCOUNTER
I called Telma to schedule a new patient appointment with the [facility]       Outcome:   I left a voice message encouraging the patient to call the genetics team at [phone] to schedule this appointment  Follow-up:   At this time the referral will be closed and we will wait to hear back from the patient regarding scheduling this appointment

## 2022-09-07 ENCOUNTER — TELEPHONE (OUTPATIENT)
Dept: GENETICS | Facility: CLINIC | Age: 37
End: 2022-09-07

## 2022-09-07 NOTE — TELEPHONE ENCOUNTER
Patient returned call to schedule her new patient appointment, genetics appt schedule for 9/14 at 2:00 pm  Pt is adopted however she speaks to her biological mom and sisters, her mom has a hx of breast cancer & ovarian ca, sister with ovarian ca  She stated that her sister with ovarian ca is half sister and had genetic testing (will try to bring copy of results) and her other sister with same father also had genetic testing ( she will try to bring copy of results too)  On paternal side, it's limited but she does not believe any cancer only kidney problems

## 2022-09-08 ENCOUNTER — HOSPITAL ENCOUNTER (OUTPATIENT)
Dept: CT IMAGING | Facility: HOSPITAL | Age: 37
End: 2022-09-08
Attending: INTERNAL MEDICINE
Payer: COMMERCIAL

## 2022-09-08 DIAGNOSIS — R10.9 ACUTE RIGHT FLANK PAIN: ICD-10-CM

## 2022-09-08 PROCEDURE — 74176 CT ABD & PELVIS W/O CONTRAST: CPT

## 2022-09-08 PROCEDURE — G1004 CDSM NDSC: HCPCS

## 2022-09-12 ENCOUNTER — TELEPHONE (OUTPATIENT)
Dept: HEMATOLOGY ONCOLOGY | Facility: CLINIC | Age: 37
End: 2022-09-12

## 2022-09-14 ENCOUNTER — TELEPHONE (OUTPATIENT)
Dept: GENETICS | Facility: CLINIC | Age: 37
End: 2022-09-14

## 2022-09-14 ENCOUNTER — CLINICAL SUPPORT (OUTPATIENT)
Dept: GENETICS | Facility: CLINIC | Age: 37
End: 2022-09-14

## 2022-09-14 DIAGNOSIS — Z80.3 FAMILY HISTORY OF BREAST CANCER: Primary | ICD-10-CM

## 2022-09-14 PROCEDURE — NA001 NO CHARGE AUDIO ONLY

## 2022-09-14 NOTE — TELEPHONE ENCOUNTER
I called and left a message for the patient regarding their missed appointment today at 2:00pm  Main number was provided for a call back to our team

## 2022-09-15 ENCOUNTER — TELEPHONE (OUTPATIENT)
Dept: GENETICS | Facility: CLINIC | Age: 37
End: 2022-09-15

## 2022-09-15 NOTE — TELEPHONE ENCOUNTER
Patient was scheduled to have a blood draw for genetic testing today 9/15/22 at 9:30  Called patient and asked message asking if she would like to re-schedule

## 2022-09-27 ENCOUNTER — OFFICE VISIT (OUTPATIENT)
Dept: OBGYN CLINIC | Facility: CLINIC | Age: 37
End: 2022-09-27
Payer: COMMERCIAL

## 2022-09-27 VITALS
WEIGHT: 180 LBS | HEIGHT: 69 IN | BODY MASS INDEX: 26.66 KG/M2 | DIASTOLIC BLOOD PRESSURE: 80 MMHG | SYSTOLIC BLOOD PRESSURE: 120 MMHG

## 2022-09-27 DIAGNOSIS — N93.9 ABNORMAL UTERINE BLEEDING (AUB): Primary | ICD-10-CM

## 2022-09-27 DIAGNOSIS — N84.0 ENDOMETRIAL POLYP: ICD-10-CM

## 2022-09-27 PROCEDURE — 99214 OFFICE O/P EST MOD 30 MIN: CPT | Performed by: OBSTETRICS & GYNECOLOGY

## 2022-09-27 RX ORDER — SODIUM CHLORIDE 9 MG/ML
125 INJECTION, SOLUTION INTRAVENOUS CONTINUOUS
Status: CANCELLED | OUTPATIENT
Start: 2022-10-20

## 2022-09-27 NOTE — H&P (VIEW-ONLY)
HPI:  Pt is a 30 y o  [id number] with Patient's last menstrual period was 2022  using abstinence for contraception presents c/o heavy irregular menses  Pelvic ultrasound shows endometrial polyp  She desires to discuss surgical management options    PMHx:   Past Medical History:   Diagnosis Date   • Abnormal Pap smear of cervix     2018-+HPV;   • Anemia    • Anxiety    • Asthma    • Chlamydia infection     treated in    • Depression    • Migraine    • Migraine headache    • PTSD (post-traumatic stress disorder)        PSHx:   Past Surgical History:   Procedure Laterality Date   •  SECTION  2018       Meds: (Not in a hospital admission)      Allergies:    Allergies   Allergen Reactions   • Latex Hives and Throat Swelling       Social Hx:    Social History     Socioeconomic History   • Marital status: /Civil Union     Spouse name: Xavi   • Number of children: 1   • Years of education: None   • Highest education level: High school graduate   Occupational History   • Occupation:    Tobacco Use   • Smoking status: Former Smoker     Years: 1 00     Types: Cigarettes     Start date: 2017     Quit date: 2018     Years since quittin 7   • Smokeless tobacco: Never Used   • Tobacco comment: 6 cigarettes per week   Vaping Use   • Vaping Use: Former   • Substances: THC (oil)   Substance and Sexual Activity   • Alcohol use: Not Currently     Alcohol/week: 0 0 - 1 0 standard drinks     Comment: 1 glass of wine monthly   • Drug use: Yes     Types: Marijuana     Comment: medical marijuana   • Sexual activity: Not Currently     Partners: Male     Comment: lifetime partners: 5;     Other Topics Concern   • None   Social History Narrative    Adventism: [employer]     Social Determinants of Health     Financial Resource Strain: Not on file   Food Insecurity: Not on file   Transportation Needs: Not on file   Physical Activity: Not on file   Stress: Not on file   Social Connections: Not on file   Intimate Partner Violence: Not on file   Housing Stability: Not on file       Ob Hx:   OB History    Para Term  AB Living   5 1 0 1 4 1   SAB IAB Ectopic Multiple Live Births   4       1      # Outcome Date GA Lbr Ty/2nd Weight Sex Delivery Anes PTL Lv   5  18 35w4d  2830 g (6 lb 3 8 oz) M CS-LTranv EPI  SERGO   4 2017              Birth Comments: no surgery    3 SAB 2015              Birth Comments: no surgery    2 2013              Birth Comments: no surgery    1 2013              Birth Comments: no surgery       Obstetric Comments   Menarche: 10      Menses: -/overnight pad every 2 hours x 4 days and then maxi pad every 4 hours           Fm Hx:   Family History   Problem Relation Age of Onset   • Ovarian cancer Mother 36   • Breast cancer Mother 44   • BRCA 1/2 Mother         positive, unsure if BRCA 1 or 2   • Cirrhosis Father    • Hepatitis Father         unsure of which type   • Ovarian cancer Sister 16        BRCA negative   • Leukemia Sister 8   • Lung cancer Sister 12   • Fibromyalgia Sister    • Asthma Sister    • Diabetes type II Brother    • Diabetes type II Brother    • No Known Problems Brother    • Colon cancer Brother          age 47   • Multiple sclerosis Brother    • No Known Problems Maternal Grandmother    • No Known Problems Maternal Grandfather    • No Known Problems Paternal Grandmother    • No Known Problems Paternal Grandfather    • No Known Problems Paternal Aunt    • No Known Problems Paternal Aunt    • No Known Problems Paternal Aunt    • No Known Problems Paternal Aunt    • No Known Problems Paternal Aunt    • No Known Problems Paternal Aunt    • No Known Problems Paternal Aunt        ROS:  Review of Systems   Constitutional: Negative for chills, fatigue, fever and unexpected weight change  HENT: Negative for congestion, mouth sores and sore throat      Respiratory: Negative for cough, chest tightness, shortness of breath and wheezing  Cardiovascular: Negative for chest pain and palpitations  Gastrointestinal: Negative for abdominal distention, abdominal pain, constipation, diarrhea, nausea and vomiting  Endocrine: Negative for cold intolerance and heat intolerance  Genitourinary: Positive for dyspareunia (no longer sexually active as a result), menstrual problem and pelvic pain  Negative for dysuria, genital sores, vaginal bleeding, vaginal discharge and vaginal pain  Musculoskeletal: Negative for arthralgias  Skin: Negative for color change and rash  Neurological: Negative for dizziness, light-headedness and headaches  Hematological: Negative for adenopathy  VS:  /80 (BP Location: Right arm, Patient Position: Sitting, Cuff Size: Standard)   Ht 5' 9" (1 753 m)   Wt 81 6 kg (180 lb)   LMP 09/16/2022   BMI 26 58 kg/m²       Exam:    Physical Exam  Constitutional:       General: She is not in acute distress  Appearance: Normal appearance  She is normal weight  She is not ill-appearing  HENT:      Head: Normocephalic and atraumatic  Eyes:      Conjunctiva/sclera: Conjunctivae normal    Cardiovascular:      Rate and Rhythm: Normal rate and regular rhythm  Heart sounds: Normal heart sounds  Pulmonary:      Effort: Pulmonary effort is normal       Breath sounds: Normal breath sounds  Abdominal:      General: Abdomen is flat  Bowel sounds are normal  There is no distension  Palpations: Abdomen is soft  There is no mass  Tenderness: There is no abdominal tenderness  There is no guarding or rebound  Hernia: No hernia is present  Musculoskeletal:         General: Normal range of motion  Cervical back: Normal range of motion  Skin:     General: Skin is warm  Findings: No erythema or rash  Neurological:      Mental Status: She is alert and oriented to person, place, and time     Psychiatric:         Mood and Affect: Mood normal  Behavior: Behavior normal          Thought Content: Thought content normal          Judgment: Judgment normal                 vulva      normal external genitalia for age and no lesions, masses, epithelial changes, or exudate    vagina    color pink and rugae  well formed rugae    cervix     parous and no lesions     uterus     NSSC, AF, NT, mobile    adnexa   right sided tendernss, no masses bilaterally     RV        deferred    Labs:  Lab Results   Component Value Date    WBC 5 74 09/23/2021    HGB 12 6 09/23/2021    HCT 39 6 09/23/2021     09/23/2021     Lab Results   Component Value Date    PREGTESTUR negative 04/10/2017       Imaging:  UTERUS:  The uterus is anteverted in position, measuring 9 3 x 4 7 x 5 4 cm  Normal uterine contour  Heterogeneous echotexture  No discrete fibroids  Trace endocervical fluid      ENDOMETRIUM:    The endometrial echo complex has an AP caliber of 10 0 mm  Ovoid polyp suggested towards the fundus slightly towards the left measuring 1 4 x 0 8 x 1 8 cm  There is internal vascularity  Also, extensive vascularity suggested in the adjacent anterior myometrium  Additional smaller polyp suggested distally at   the level of the uterine body measures 0 8 x 0 5 x 0 9 cm  There is internal vascularity        OVARIES/ADNEXA:  Right ovary:  3 3 x 2 6 x 1 7 cm  7 5 mL  No suspicious right ovarian abnormality  Doppler flow within normal limits      Left ovary:  2 2 x 2 6 x 1 9 cm  5 7 mL  No suspicious left ovarian abnormality  Doppler flow within normal limits      No suspicious adnexal mass or loculated collections  There is no free fluid      IMPRESSION:     1  Two endometrial polyps suggested as noted above  Correlate with hysteroscopy  A/P: 30 y o  [id number] with Patient's last menstrual period was 09/16/2022  with heavy irregular menses, endomterial polyps by ultrasound  for D&C c with hysteroscopy and hysteroscopic polypectomy     The risks (infection, bleeding, transfusion, damage to adjacent organs requiring immediate and/or delayed repair, clots inside blood vessels, need to complete procedure using alternative approach, procedural failure, worsening of pre-exisiting medical condition, and death) and alternatives (see outpatient record) have been discussed and patient desires to proceed with D&C c with hysteroscopy and hysteroscopic polypectomy at [facility] on 10/20/2022       Surgery packet reviewed with patient  Consents reviewed and signed

## 2022-09-27 NOTE — PROGRESS NOTES
HPI:  Pt is a 30 y o  [id number] with Patient's last menstrual period was 2022  using abstinence for contraception presents c/o heavy irregular menses  Pelvic ultrasound shows endometrial polyp  She desires to discuss surgical management options    PMHx:   Past Medical History:   Diagnosis Date    Abnormal Pap smear of cervix     2018-+HPV;    Anemia     Anxiety     Asthma     Chlamydia infection     treated in     Depression     Migraine     Migraine headache     PTSD (post-traumatic stress disorder)        PSHx:   Past Surgical History:   Procedure Laterality Date     SECTION  2018       Meds: (Not in a hospital admission)      Allergies:    Allergies   Allergen Reactions    Latex Hives and Throat Swelling       Social Hx:    Social History     Socioeconomic History    Marital status: /Civil Union     Spouse name: Reynaldo Jerry Number of children: 1    Years of education: None    Highest education level: High school graduate   Occupational History    Occupation:    Tobacco Use    Smoking status: Former Smoker     Years: 1 00     Types: Cigarettes     Start date: 2017     Quit date: 2018     Years since quittin 7    Smokeless tobacco: Never Used    Tobacco comment: 6 cigarettes per week   Vaping Use    Vaping Use: Former    Substances: THC (oil)   Substance and Sexual Activity    Alcohol use: Not Currently     Alcohol/week: 0 0 - 1 0 standard drinks     Comment: 1 glass of wine monthly    Drug use: Yes     Types: Marijuana     Comment: medical marijuana    Sexual activity: Not Currently     Partners: Male     Comment: lifetime partners: 11;     Other Topics Concern    None   Social History Narrative    Confucianist: [employer]     Social Determinants of Health     Financial Resource Strain: Not on file   Food Insecurity: Not on file   Transportation Needs: Not on file   Physical Activity: Not on file   Stress: Not on file   Social Connections: Not on file   Intimate Partner Violence: Not on file   Housing Stability: Not on file       Ob Hx:   OB History    Para Term  AB Living   5 1 0 1 4 1   SAB IAB Ectopic Multiple Live Births   4       1      # Outcome Date GA Lbr Ty/2nd Weight Sex Delivery Anes PTL Lv   5  18 35w4d  2830 g (6 lb 3 8 oz) M CS-LTranv EPI  SERGO   4 2017              Birth Comments: no surgery    3 SAB 2015              Birth Comments: no surgery    2 2013              Birth Comments: no surgery    1 2013              Birth Comments: no surgery       Obstetric Comments   Menarche: 10      Menses: -/overnight pad every 2 hours x 4 days and then maxi pad every 4 hours           Fm Hx:   Family History   Problem Relation Age of Onset    Ovarian cancer Mother 36    Breast cancer Mother 44    BRCA 1/2 Mother         positive, unsure if BRCA 1 or 2    Cirrhosis Father     Hepatitis Father         unsure of which type    Ovarian cancer Sister 16        BRCA negative    Leukemia Sister 8    Lung cancer Sister 12    Fibromyalgia Sister     Asthma Sister     Diabetes type II Brother     Diabetes type II Brother     No Known Problems Brother     Colon cancer Brother          age 47    Multiple sclerosis Brother     No Known Problems Maternal Grandmother     No Known Problems Maternal Grandfather     No Known Problems Paternal Grandmother     No Known Problems Paternal Grandfather     No Known Problems Paternal Aunt     No Known Problems Paternal Aunt     No Known Problems Paternal Aunt     No Known Problems Paternal Aunt     No Known Problems Paternal Aunt     No Known Problems Paternal Aunt     No Known Problems Paternal Aunt        ROS:  Review of Systems   Constitutional: Negative for chills, fatigue, fever and unexpected weight change  HENT: Negative for congestion, mouth sores and sore throat      Respiratory: Negative for cough, chest tightness, shortness of breath and wheezing  Cardiovascular: Negative for chest pain and palpitations  Gastrointestinal: Negative for abdominal distention, abdominal pain, constipation, diarrhea, nausea and vomiting  Endocrine: Negative for cold intolerance and heat intolerance  Genitourinary: Positive for dyspareunia (no longer sexually active as a result), menstrual problem and pelvic pain  Negative for dysuria, genital sores, vaginal bleeding, vaginal discharge and vaginal pain  Musculoskeletal: Negative for arthralgias  Skin: Negative for color change and rash  Neurological: Negative for dizziness, light-headedness and headaches  Hematological: Negative for adenopathy  VS:  /80 (BP Location: Right arm, Patient Position: Sitting, Cuff Size: Standard)   Ht 5' 9" (1 753 m)   Wt 81 6 kg (180 lb)   LMP 09/16/2022   BMI 26 58 kg/m²       Exam:    Physical Exam  Constitutional:       General: She is not in acute distress  Appearance: Normal appearance  She is normal weight  She is not ill-appearing  HENT:      Head: Normocephalic and atraumatic  Eyes:      Conjunctiva/sclera: Conjunctivae normal    Cardiovascular:      Rate and Rhythm: Normal rate and regular rhythm  Heart sounds: Normal heart sounds  Pulmonary:      Effort: Pulmonary effort is normal       Breath sounds: Normal breath sounds  Abdominal:      General: Abdomen is flat  Bowel sounds are normal  There is no distension  Palpations: Abdomen is soft  There is no mass  Tenderness: There is no abdominal tenderness  There is no guarding or rebound  Hernia: No hernia is present  Musculoskeletal:         General: Normal range of motion  Cervical back: Normal range of motion  Skin:     General: Skin is warm  Findings: No erythema or rash  Neurological:      Mental Status: She is alert and oriented to person, place, and time     Psychiatric:         Mood and Affect: Mood normal  Behavior: Behavior normal          Thought Content: Thought content normal          Judgment: Judgment normal                 vulva      normal external genitalia for age and no lesions, masses, epithelial changes, or exudate    vagina    color pink and rugae  well formed rugae    cervix     parous and no lesions     uterus     NSSC, AF, NT, mobile    adnexa   right sided tendernss, no masses bilaterally     RV        deferred    Labs:  Lab Results   Component Value Date    WBC 5 74 09/23/2021    HGB 12 6 09/23/2021    HCT 39 6 09/23/2021     09/23/2021     Lab Results   Component Value Date    PREGTESTUR negative 04/10/2017       Imaging:  UTERUS:  The uterus is anteverted in position, measuring 9 3 x 4 7 x 5 4 cm  Normal uterine contour  Heterogeneous echotexture  No discrete fibroids  Trace endocervical fluid      ENDOMETRIUM:    The endometrial echo complex has an AP caliber of 10 0 mm  Ovoid polyp suggested towards the fundus slightly towards the left measuring 1 4 x 0 8 x 1 8 cm  There is internal vascularity  Also, extensive vascularity suggested in the adjacent anterior myometrium  Additional smaller polyp suggested distally at   the level of the uterine body measures 0 8 x 0 5 x 0 9 cm  There is internal vascularity        OVARIES/ADNEXA:  Right ovary:  3 3 x 2 6 x 1 7 cm  7 5 mL  No suspicious right ovarian abnormality  Doppler flow within normal limits      Left ovary:  2 2 x 2 6 x 1 9 cm  5 7 mL  No suspicious left ovarian abnormality  Doppler flow within normal limits      No suspicious adnexal mass or loculated collections  There is no free fluid      IMPRESSION:     1  Two endometrial polyps suggested as noted above  Correlate with hysteroscopy  A/P: 30 y o  [id number] with Patient's last menstrual period was 09/16/2022  with heavy irregular menses, endomterial polyps by ultrasound  for D&C c with hysteroscopy and hysteroscopic polypectomy     The risks (infection, bleeding, transfusion, damage to adjacent organs requiring immediate and/or delayed repair, clots inside blood vessels, need to complete procedure using alternative approach, procedural failure, worsening of pre-exisiting medical condition, and death) and alternatives (see outpatient record) have been discussed and patient desires to proceed with D&C c with hysteroscopy and hysteroscopic polypectomy at [facility] on 10/20/2022       Surgery packet reviewed with patient  Consents reviewed and signed

## 2022-10-18 ENCOUNTER — ANESTHESIA EVENT (OUTPATIENT)
Dept: PERIOP | Facility: HOSPITAL | Age: 37
End: 2022-10-18
Payer: COMMERCIAL

## 2022-10-19 NOTE — PRE-PROCEDURE INSTRUCTIONS
No outpatient medications have been marked as taking for the 10/20/22 encounter [facility] Encounter)  Per pt-not taking any current medications    Patient / instructed on use of chlorhexidine soap per hospital protocol    Patient instructed to stop all ASA, NSAIDS, vitamins and herbal supplements from now to surgery or per Dr Chris Meyer  Tylenol okay to take if needed

## 2022-10-20 ENCOUNTER — ANESTHESIA (OUTPATIENT)
Dept: PERIOP | Facility: HOSPITAL | Age: 37
End: 2022-10-20
Payer: COMMERCIAL

## 2022-10-20 ENCOUNTER — HOSPITAL ENCOUNTER (OUTPATIENT)
Facility: HOSPITAL | Age: 37
Setting detail: OUTPATIENT SURGERY
Discharge: HOME | End: 2022-10-20
Attending: OBSTETRICS & GYNECOLOGY | Admitting: OBSTETRICS & GYNECOLOGY
Payer: COMMERCIAL

## 2022-10-20 VITALS
TEMPERATURE: 97.8 F | BODY MASS INDEX: 26.74 KG/M2 | HEIGHT: 69 IN | RESPIRATION RATE: 18 BRPM | HEART RATE: 78 BPM | WEIGHT: 180.56 LBS | DIASTOLIC BLOOD PRESSURE: 62 MMHG | OXYGEN SATURATION: 99 % | SYSTOLIC BLOOD PRESSURE: 118 MMHG

## 2022-10-20 DIAGNOSIS — N84.0 ENDOMETRIAL POLYP: ICD-10-CM

## 2022-10-20 DIAGNOSIS — N93.9 ABNORMAL UTERINE BLEEDING (AUB): ICD-10-CM

## 2022-10-20 PROBLEM — F43.10 PTSD (POST-TRAUMATIC STRESS DISORDER): Status: ACTIVE | Noted: 2022-10-20

## 2022-10-20 PROBLEM — Z98.890 S/P DILATION AND CURETTAGE: Status: ACTIVE | Noted: 2022-10-20

## 2022-10-20 LAB
ANION GAP SERPL CALCULATED.3IONS-SCNC: 8 MMOL/L (ref 4–13)
BUN SERPL-MCNC: 7 MG/DL (ref 5–25)
CALCIUM SERPL-MCNC: 9 MG/DL (ref 8.3–10.1)
CHLORIDE SERPL-SCNC: 104 MMOL/L (ref 96–108)
CO2 SERPL-SCNC: 28 MMOL/L (ref 21–32)
CREAT SERPL-MCNC: 0.66 MG/DL (ref 0.6–1.3)
ERYTHROCYTE [DISTWIDTH] IN BLOOD BY AUTOMATED COUNT: 13.3 % (ref 11.6–15.1)
EXT PREGNANCY TEST URINE: NEGATIVE
EXT. CONTROL: NORMAL
GFR SERPL CREATININE-BSD FRML MDRD: 114 ML/MIN/1.73SQ M
GLUCOSE P FAST SERPL-MCNC: 104 MG/DL (ref 65–99)
GLUCOSE SERPL-MCNC: 104 MG/DL (ref 65–140)
HCG SERPL QL: NEGATIVE
HCT VFR BLD AUTO: 38.4 % (ref 34.8–46.1)
HGB BLD-MCNC: 12.4 G/DL (ref 11.5–15.4)
MCH RBC QN AUTO: 29.4 PG (ref 26.8–34.3)
MCHC RBC AUTO-ENTMCNC: 32.3 G/DL (ref 31.4–37.4)
MCV RBC AUTO: 91 FL (ref 82–98)
PLATELET # BLD AUTO: 243 THOUSANDS/UL (ref 149–390)
PMV BLD AUTO: 10 FL (ref 8.9–12.7)
POTASSIUM SERPL-SCNC: 3.5 MMOL/L (ref 3.5–5.3)
RBC # BLD AUTO: 4.22 MILLION/UL (ref 3.81–5.12)
SODIUM SERPL-SCNC: 140 MMOL/L (ref 135–147)
WBC # BLD AUTO: 7.31 THOUSAND/UL (ref 4.31–10.16)

## 2022-10-20 PROCEDURE — 85027 COMPLETE CBC AUTOMATED: CPT | Performed by: OBSTETRICS & GYNECOLOGY

## 2022-10-20 PROCEDURE — 80048 BASIC METABOLIC PNL TOTAL CA: CPT | Performed by: OBSTETRICS & GYNECOLOGY

## 2022-10-20 PROCEDURE — 81025 URINE PREGNANCY TEST: CPT | Performed by: OBSTETRICS & GYNECOLOGY

## 2022-10-20 PROCEDURE — 84703 CHORIONIC GONADOTROPIN ASSAY: CPT | Performed by: ANESTHESIOLOGY

## 2022-10-20 RX ORDER — MIDAZOLAM HYDROCHLORIDE 2 MG/2ML
INJECTION, SOLUTION INTRAMUSCULAR; INTRAVENOUS AS NEEDED
Status: DISCONTINUED | OUTPATIENT
Start: 2022-10-20 | End: 2022-10-20

## 2022-10-20 RX ORDER — ACETAMINOPHEN 325 MG/1
975 TABLET ORAL EVERY 6 HOURS PRN
Status: DISCONTINUED | OUTPATIENT
Start: 2022-10-20 | End: 2022-10-20 | Stop reason: HOSPADM

## 2022-10-20 RX ORDER — SODIUM CHLORIDE, SODIUM LACTATE, POTASSIUM CHLORIDE, CALCIUM CHLORIDE 600; 310; 30; 20 MG/100ML; MG/100ML; MG/100ML; MG/100ML
INJECTION, SOLUTION INTRAVENOUS CONTINUOUS PRN
Status: DISCONTINUED | OUTPATIENT
Start: 2022-10-20 | End: 2022-10-20

## 2022-10-20 RX ORDER — KETOROLAC TROMETHAMINE 30 MG/ML
INJECTION, SOLUTION INTRAMUSCULAR; INTRAVENOUS AS NEEDED
Status: DISCONTINUED | OUTPATIENT
Start: 2022-10-20 | End: 2022-10-20

## 2022-10-20 RX ORDER — PROPOFOL 10 MG/ML
INJECTION, EMULSION INTRAVENOUS AS NEEDED
Status: DISCONTINUED | OUTPATIENT
Start: 2022-10-20 | End: 2022-10-20

## 2022-10-20 RX ORDER — SODIUM CHLORIDE 9 MG/ML
125 INJECTION, SOLUTION INTRAVENOUS CONTINUOUS
Status: DISCONTINUED | OUTPATIENT
Start: 2022-10-20 | End: 2022-10-20 | Stop reason: HOSPADM

## 2022-10-20 RX ORDER — SODIUM CHLORIDE, SODIUM LACTATE, POTASSIUM CHLORIDE, CALCIUM CHLORIDE 600; 310; 30; 20 MG/100ML; MG/100ML; MG/100ML; MG/100ML
125 INJECTION, SOLUTION INTRAVENOUS CONTINUOUS
Status: DISCONTINUED | OUTPATIENT
Start: 2022-10-20 | End: 2022-10-20 | Stop reason: HOSPADM

## 2022-10-20 RX ORDER — FENTANYL CITRATE/PF 50 MCG/ML
25 SYRINGE (ML) INJECTION
Status: DISCONTINUED | OUTPATIENT
Start: 2022-10-20 | End: 2022-10-20 | Stop reason: HOSPADM

## 2022-10-20 RX ORDER — ONDANSETRON 2 MG/ML
INJECTION INTRAMUSCULAR; INTRAVENOUS AS NEEDED
Status: DISCONTINUED | OUTPATIENT
Start: 2022-10-20 | End: 2022-10-20

## 2022-10-20 RX ORDER — ONDANSETRON 2 MG/ML
4 INJECTION INTRAMUSCULAR; INTRAVENOUS ONCE AS NEEDED
Status: DISCONTINUED | OUTPATIENT
Start: 2022-10-20 | End: 2022-10-20 | Stop reason: HOSPADM

## 2022-10-20 RX ORDER — PROPOFOL 10 MG/ML
INJECTION, EMULSION INTRAVENOUS CONTINUOUS PRN
Status: DISCONTINUED | OUTPATIENT
Start: 2022-10-20 | End: 2022-10-20

## 2022-10-20 RX ORDER — FENTANYL CITRATE 50 UG/ML
INJECTION, SOLUTION INTRAMUSCULAR; INTRAVENOUS AS NEEDED
Status: DISCONTINUED | OUTPATIENT
Start: 2022-10-20 | End: 2022-10-20

## 2022-10-20 RX ORDER — MEPERIDINE HYDROCHLORIDE 25 MG/ML
12.5 INJECTION INTRAMUSCULAR; INTRAVENOUS; SUBCUTANEOUS
Status: DISCONTINUED | OUTPATIENT
Start: 2022-10-20 | End: 2022-10-20 | Stop reason: HOSPADM

## 2022-10-20 RX ORDER — IBUPROFEN 600 MG/1
600 TABLET ORAL EVERY 6 HOURS PRN
Status: DISCONTINUED | OUTPATIENT
Start: 2022-10-20 | End: 2022-10-20 | Stop reason: HOSPADM

## 2022-10-20 RX ORDER — MAGNESIUM HYDROXIDE 1200 MG/15ML
LIQUID ORAL AS NEEDED
Status: DISCONTINUED | OUTPATIENT
Start: 2022-10-20 | End: 2022-10-20 | Stop reason: HOSPADM

## 2022-10-20 RX ORDER — DEXAMETHASONE SODIUM PHOSPHATE 10 MG/ML
INJECTION, SOLUTION INTRAMUSCULAR; INTRAVENOUS AS NEEDED
Status: DISCONTINUED | OUTPATIENT
Start: 2022-10-20 | End: 2022-10-20

## 2022-10-20 RX ADMIN — IBUPROFEN 600 MG: 600 TABLET, FILM COATED ORAL at 17:33

## 2022-10-20 RX ADMIN — SODIUM CHLORIDE, SODIUM LACTATE, POTASSIUM CHLORIDE, AND CALCIUM CHLORIDE 125 ML/HR: .6; .31; .03; .02 INJECTION, SOLUTION INTRAVENOUS at 13:04

## 2022-10-20 RX ADMIN — PROPOFOL 120 MCG/KG/MIN: 10 INJECTION, EMULSION INTRAVENOUS at 14:50

## 2022-10-20 RX ADMIN — FENTANYL CITRATE 50 MCG: 50 INJECTION, SOLUTION INTRAMUSCULAR; INTRAVENOUS at 15:01

## 2022-10-20 RX ADMIN — MIDAZOLAM HYDROCHLORIDE 2 MG: 1 INJECTION, SOLUTION INTRAMUSCULAR; INTRAVENOUS at 14:35

## 2022-10-20 RX ADMIN — SODIUM CHLORIDE, SODIUM LACTATE, POTASSIUM CHLORIDE, AND CALCIUM CHLORIDE: .6; .31; .03; .02 INJECTION, SOLUTION INTRAVENOUS at 15:25

## 2022-10-20 RX ADMIN — KETOROLAC TROMETHAMINE 15 MG: 30 INJECTION, SOLUTION INTRAMUSCULAR; INTRAVENOUS at 15:29

## 2022-10-20 RX ADMIN — FENTANYL CITRATE 50 MCG: 50 INJECTION, SOLUTION INTRAMUSCULAR; INTRAVENOUS at 14:53

## 2022-10-20 RX ADMIN — PROPOFOL 200 MG: 10 INJECTION, EMULSION INTRAVENOUS at 14:50

## 2022-10-20 RX ADMIN — MIDAZOLAM HYDROCHLORIDE 2 MG: 1 INJECTION, SOLUTION INTRAMUSCULAR; INTRAVENOUS at 14:46

## 2022-10-20 RX ADMIN — FENTANYL CITRATE 25 MCG: 50 INJECTION INTRAMUSCULAR; INTRAVENOUS at 16:13

## 2022-10-20 RX ADMIN — FENTANYL CITRATE 25 MCG: 50 INJECTION INTRAMUSCULAR; INTRAVENOUS at 16:23

## 2022-10-20 RX ADMIN — ONDANSETRON 4 MG: 2 INJECTION INTRAMUSCULAR; INTRAVENOUS at 15:30

## 2022-10-20 RX ADMIN — SODIUM CHLORIDE: 0.9 INJECTION, SOLUTION INTRAVENOUS at 14:40

## 2022-10-20 RX ADMIN — FENTANYL CITRATE 25 MCG: 50 INJECTION INTRAMUSCULAR; INTRAVENOUS at 16:18

## 2022-10-20 RX ADMIN — DEXAMETHASONE SODIUM PHOSPHATE 8 MG: 10 INJECTION, SOLUTION INTRAMUSCULAR; INTRAVENOUS at 14:55

## 2022-10-20 NOTE — ANESTHESIA PREPROCEDURE EVALUATION
Procedure:  D&C W/HYSTEROSCOPY (N/A Uterus)  POLYPECTOMY (N/A Uterus)    Relevant Problems   HEMATOLOGY   (+) Anemia   (+) Iron deficiency anemia due to chronic blood loss                             (+) MJ use     Physical Exam    Airway    Mallampati score: I  TM Distance: >3 FB  Neck ROM: full     Dental   No notable dental hx     Cardiovascular  Rhythm: regular, Rate: normal, Cardiovascular exam normal    Pulmonary  Pulmonary exam normal Breath sounds clear to auscultation,     Other Findings        Anesthesia Plan  ASA Score- 2     Anesthesia Type- general with ASA Monitors  Additional Monitors:   Airway Plan: LMA  Plan Factors-Exercise tolerance (METS): >4 METS  Chart reviewed  Patient summary reviewed  Patient is a current smoker  Patient instructed to abstain from smoking on day of procedure  Patient did not smoke on day of surgery  Induction- intravenous  Postoperative Plan- Plan for postoperative opioid use  Planned trial extubation    Informed Consent- Anesthetic plan and risks discussed with patient

## 2022-10-20 NOTE — OP NOTE
OPERATIVE REPORT  PATIENT NAME: Dheeraj Hernandez    :  1985  MRN: [id number]  Pt Location: [facility] OR ROOM 07    SURGERY DATE: 10/20/2022    Surgeon(s) and Role:     * Mic Meyers MD - Primary     * Karri Knox MD - Assisting    Preop Diagnosis:  Abnormal uterine bleeding (AUB) [N93 9]  Endometrial polyp [N84 0]    Post-Op Diagnosis Codes:     * Abnormal uterine bleeding (AUB) [N93 9]     * Endometrial polyp [N84 0]    Procedure(s) (LRB):  D&C W/HYSTEROSCOPY (N/A)  POLYPECTOMY (N/A)    Specimen(s):  ID Type Source Tests Collected by Time Destination   1 : emc  Tissue Endometrium TISSUE EXAM Mic Meyers MD 10/20/2022 1451    2 : endometrial polyp Tissue Polyp, Cervical/Endometrial TISSUE EXAM Mic Meyers MD 10/20/2022 1522        Estimated Blood Loss:   20 mL    Drains:  None    Anesthesia Type:   General LMA    Operative Indications:  Abnormal uterine bleeding (AUB) [N93 9]  Endometrial polyp [N84 0]      Operative Findings:  Operative Findings:   1  External genitalia grossly normal in appearance  No ulcerations, no lacerations, no lesions  2   Vagina and cervix were  grossly normal in appearance without any lacerations or lesions  3  Uterus sounded to 8 cm  4  Hysteroscopic examination revealed proliferative endometrial lining, particularly along the posterior surface  A mass was noted approximately on the anterior wall of the uterus  It had a wide base and a heterogenous texture  Bilateral ostia were  visualized  Complications:   None apparent    Procedure and Technique:  The patient was taken to the operating room where a time out was performed to confirm correct patient and correct procedure  General LMA anesthesia (LMA) was administered and the patient was positioned on the OR table in the dorsal lithotomy position  All pressure points were padded and a joy hugger was placed to maintain control of core body temperature   The patient was prepped and draped in the usual sterile fashion  A straight catheter was introduced into the bladder, which was drained of 200cc of clear yellow urine  A weighted speculum was inserted into the vagina and a Alton retractor was used to visualize the anterior lip of the cervix, which was then grasped with a single toothed tenaculum  The uterus was sounded to 8cm  The cervix was serially dilated to 19 [place] using Moi dilators for introduction of the hysteroscope  Hysteroscope was introduced under direct visualization using normal saline solution as the distention media  Symphion hysteroscope was advanced to the uterine fundus and the entire uterine cavity was inspected in a systematic manner  A mass was noted on the anterior uterine wall  It had a wide base and a heterogenous texture  Symphion device was then insterted and placed adjacent to the structure  It was resected and noted to be polyp like in some areas and more similar to a fibroid in others  Hysteroscope was withdrawn and sharp curetting was performed, starting at the 12'oclock position and rotating a total of 360 degrees to cover all surfaces  Endometrial tissue was obtained and sent for pathology  The single toothed tenaculum was removed from the anterior lip of the cervix  Good hemostasis was confirmed at the tenaculum puncture sites  Weighted speculum was then removed from the vagina  At the conclusion of the procedure, all needle, sponge, and instrument counts were noted to be correct x2  Fluid deficit of 500 cc was noted at the conclusion of the case  Dr Chucho Gonzalez was present and participated in all key portions of the case  Patient Disposition:  PACU         SIGNATURE: Bari Carr MD  DATE: October 20, 2022  TIME: 3:44 PM    I agree with the operative report as dictated by Dr Javad Camp and edited by me  I was present for and participated in the entire procedure      Dandy Wade MD

## 2022-10-20 NOTE — INTERVAL H&P NOTE
H&P reviewed  After examining the patient I find no changes in the patients condition since the H&P had been written      Vitals:    10/20/22 1148   BP: 118/68   Pulse: 80   Resp: 16   Temp: 98 °F (36 7 °C)   SpO2: 97%

## 2022-10-20 NOTE — ANESTHESIA POSTPROCEDURE EVALUATION
Post-Op Assessment Note    CV Status:  Stable    Pain management: adequate     Mental Status:  Alert and awake   Hydration Status:  Euvolemic   PONV Controlled:  Controlled   Airway Patency:  Patent      Post Op Vitals Reviewed: Yes      Staff: Anesthesiologist         No complications documented      BP      Temp      Pulse     Resp      SpO2      /57   Pulse 66   Temp 97 5 °F (36 4 °C)   Resp 17   Ht 5' 9" (1 753 m)   Wt 81 9 kg (180 lb 8 9 oz)   LMP 10/05/2022 (Approximate)   SpO2 96%   BMI 26 66 kg/m²

## 2022-10-24 ENCOUNTER — TELEPHONE (OUTPATIENT)
Dept: OBGYN CLINIC | Facility: CLINIC | Age: 37
End: 2022-10-24

## 2022-10-24 NOTE — TELEPHONE ENCOUNTER
An appt for today opened up with Sal Jimenez  Called to offer appt to patient for this afternoon  Patient declined  States she doesn't have a way to get here and would like to see Dr Carmen Treviño instead  Wants to keep her appt for Wednesday  Advised patient to contact the office if fever returns and or if she develops any discharge  Verbalized understanding

## 2022-10-24 NOTE — TELEPHONE ENCOUNTER
I would recommend she get tested for COVID/FLU/RSV at her PCP office and please have her come in for evaluation in the office    Also please see if she has a foul smelling discharge or other vaginal symptoms

## 2022-10-24 NOTE — TELEPHONE ENCOUNTER
Patient returned call from [place] message  Had a temperature of 101 0 on 10/22/2022 and 10/23/2022  States "it felt like I had the flu"  Was complaining of body aches  Still complaining of a sore throat  Temperature is 98 0 today but says she was sweating all night and has been taking tylenol and ibuprofen  Had a hysteroscopy on 10/20/2022

## 2022-10-24 NOTE — TELEPHONE ENCOUNTER
Spoke to patient  States son was sick prior to surgery and states "I think I caught whatever he had"  Denies vaginal odor  Says she did notice some white discharge this morning  Advised her to follow up with PCP  Appt scheduled her to evaluate

## 2022-10-26 ENCOUNTER — TELEPHONE (OUTPATIENT)
Dept: INTERNAL MEDICINE CLINIC | Facility: CLINIC | Age: 37
End: 2022-10-26

## 2022-10-26 ENCOUNTER — OFFICE VISIT (OUTPATIENT)
Dept: URGENT CARE | Facility: MEDICAL CENTER | Age: 37
End: 2022-10-26
Payer: COMMERCIAL

## 2022-10-26 VITALS
OXYGEN SATURATION: 99 % | DIASTOLIC BLOOD PRESSURE: 78 MMHG | RESPIRATION RATE: 20 BRPM | SYSTOLIC BLOOD PRESSURE: 117 MMHG | HEIGHT: 69 IN | TEMPERATURE: 98.3 F | HEART RATE: 108 BPM | BODY MASS INDEX: 25.03 KG/M2 | WEIGHT: 169 LBS

## 2022-10-26 DIAGNOSIS — J02.9 SORE THROAT: Primary | ICD-10-CM

## 2022-10-26 DIAGNOSIS — R09.81 NASAL CONGESTION: ICD-10-CM

## 2022-10-26 DIAGNOSIS — R05.1 ACUTE COUGH: ICD-10-CM

## 2022-10-26 LAB — S PYO AG THROAT QL: NEGATIVE

## 2022-10-26 PROCEDURE — 99213 OFFICE O/P EST LOW 20 MIN: CPT

## 2022-10-26 PROCEDURE — 87880 STREP A ASSAY W/OPTIC: CPT

## 2022-10-26 PROCEDURE — 87070 CULTURE OTHR SPECIMN AEROBIC: CPT

## 2022-10-26 RX ORDER — FLUTICASONE PROPIONATE 50 MCG
2 SPRAY, SUSPENSION (ML) NASAL DAILY
Qty: 15.8 ML | Refills: 0 | Status: SHIPPED | OUTPATIENT
Start: 2022-10-26

## 2022-10-26 RX ORDER — BENZONATATE 200 MG/1
200 CAPSULE ORAL 3 TIMES DAILY PRN
Qty: 20 CAPSULE | Refills: 0 | Status: SHIPPED | OUTPATIENT
Start: 2022-10-26 | End: 2022-10-28 | Stop reason: SDUPTHER

## 2022-10-26 NOTE — TELEPHONE ENCOUNTER
Pt called in stating that she has been trying to get in touch with the office to schedule a sick appointment and has not heard back  No available appts for today  Please give pt a call to schedule an appt

## 2022-10-26 NOTE — PATIENT INSTRUCTIONS
Use flonase as directed  Recommend throat lozenges and gargling warm salt water for throat irritation  Use benzonatate as directed as needed for cough  Hydration and rest   Acetaminophen and ibuprofen for pain relief and fever reduction  Throat culture sent  Use the [facility] to obtain lab results  PCP follow up in 3-5 days  Go to an emergency department if difficulty breathing occurs or if symptoms worsen

## 2022-10-26 NOTE — PROGRESS NOTES
[facility]        NAME: Gabbie Krishnamurthy is a 30 y o  female  : 1985    MRN: [id number]  DATE: 2022  TIME: 12:24 PM      Assessment and Plan     Sore throat [J02 9]  1  Sore throat  POCT rapid strepA    Throat culture   2  Acute cough  benzonatate (TESSALON) 200 MG capsule   3  Nasal congestion  fluticasone (FLONASE) 50 mcg/act nasal spray     Rapid strep negative  Throat culture sent  Offered covid/influenza- patient declined  Patient Instructions     Use flonase as directed  Recommend throat lozenges and gargling warm salt water for throat irritation  Use benzonatate as directed as needed for cough  Hydration and rest   Acetaminophen and ibuprofen for pain relief and fever reduction  Throat culture sent  [facility] obtain [facility] results  PCP follow up in 3-5 days  Go to an emergency department if difficulty breathing occurs or if symptoms worsen  Chief Complaint     Chief Complaint   Patient presents with   • Cold Like Symptoms     Patient here for cough decreased hearing, sore throat, her son is also is sick         History of Present Illness     Patient is a 61-year-old female who presents with cough, nasal congestion, and sore throat for 3 days  Reports fever that resolved  Denies n/v/d  Denies abdominal pain  States that her son was sick before she was  Reports ear pain  Review of Systems     Review of Systems   Constitutional: Negative for fever  HENT: Positive for congestion, ear pain, sinus pressure, sinus pain and sore throat  Respiratory: Positive for cough  Gastrointestinal: Negative for abdominal pain, diarrhea, nausea and vomiting  All other systems reviewed and are negative          Current Medications       Current Outpatient Medications:   •  benzonatate (TESSALON) 200 MG capsule, Take 1 capsule (200 mg total) by mouth 3 (three) times a day as needed for cough, Disp: 20 capsule, Rfl: 0  •  fluticasone (FLONASE) 50 mcg/act nasal spray, 2 sprays into each nostril daily, Disp: 15 8 mL, Rfl: 0    Current Allergies     Allergies as of 10/26/2022 - Reviewed 10/26/2022   Allergen Reaction Noted   • Latex Hives and Throat Swelling 2017              The following portions of the patient's history were reviewed and updated as appropriate: allergies, current medications, past family history, past medical history, past social history, past surgical history and problem list      Past Medical History:   Diagnosis Date   • Abdominal pain    • Abnormal Pap smear of cervix     2018-+HPV;   • Anemia    • Anxiety     and preop anxiety(provided emotional support) per pt--also has 3 yr old autistic son--his" Dad does help pt when needed-very supportive"   • Asthma    • Chest pain     per pt"has gone to ED and had heart checked" reports PCP feels related to anxiety"   • Chlamydia infection     treated in    • Depression     per pt "stopped taking her meds"   • Exercise involving stretching     also yoga   • Family history of breast cancer     "Mom and also has Ovarian Cancer"   • Heavy menses     "and clots"   • History of transfusion    • Indigestion    • Low back pain    • Medical marijuana use    • Migraine    • Migraine headache    • Motion sickness    • PONV (postoperative nausea and vomiting)    • PTSD (post-traumatic stress disorder)        Past Surgical History:   Procedure Laterality Date   •  SECTION  2018   • POLYPECTOMY N/A 10/20/2022    Procedure: POLYPECTOMY;  Surgeon: Jennifer Deras MD;  [facility];  Service: Gynecology   • [place] HYSTEROSCOPY,W/ENDO BX N/A 10/20/2022    Procedure: D&C W/HYSTEROSCOPY;  Surgeon: Jennifer Deras MD;  [facility];  Service: Gynecology       Family History   Problem Relation Age of Onset   • Ovarian cancer Mother 36   • Breast cancer Mother 44   • BRCA 1/2 Mother         positive, unsure if BRCA 1 or 2   • Cirrhosis Father    • Hepatitis Father         unsure of which type   • Ovarian cancer Sister 16        BRCA negative   • Leukemia Sister 8   • Lung cancer Sister 12   • Fibromyalgia Sister    • Asthma Sister    • Diabetes type II Brother    • Diabetes type II Brother    • No Known Problems Brother    • Colon cancer Brother          age 47   • Multiple sclerosis Brother    • No Known Problems Maternal Grandmother    • No Known Problems Maternal Grandfather    • No Known Problems Paternal Grandmother    • No Known Problems Paternal Grandfather    • No Known Problems Paternal Aunt    • No Known Problems Paternal Aunt    • No Known Problems Paternal Aunt    • No Known Problems Paternal Aunt    • No Known Problems Paternal Aunt    • No Known Problems Paternal Aunt    • No Known Problems Paternal Aunt          Medications have been verified  Objective     /78   Pulse (!) 108   Temp 98 3 °F (36 8 °C)   Resp 20   Ht 5' 9" (1 753 m)   Wt 76 7 kg (169 lb)   LMP 10/05/2022 (Approximate)   SpO2 99%   BMI 24 96 kg/m²   Patient's last menstrual period was 10/05/2022 (approximate)  Physical Exam     Physical Exam  Vitals and nursing note reviewed  Constitutional:       General: She is awake  She is not in acute distress  Appearance: Normal appearance  She is not ill-appearing, toxic-appearing or diaphoretic  HENT:      Right Ear: Tympanic membrane, ear canal and external ear normal       Left Ear: Tympanic membrane, ear canal and external ear normal       Nose: Nose normal       Mouth/Throat:      Lips: Pink  Mouth: Mucous membranes are moist       Pharynx: Oropharynx is clear  Uvula midline  Posterior oropharyngeal erythema (viral ulcers, no exudate) present  No pharyngeal swelling, oropharyngeal exudate or uvula swelling  Tonsils: No tonsillar exudate  1+ on the right  1+ on the left  Cardiovascular:      Rate and Rhythm: Normal rate  Pulses: Normal pulses        Heart sounds: Normal heart sounds, S1 normal and S2 normal  Pulmonary:      Effort: Pulmonary effort is normal       Breath sounds: Normal breath sounds and air entry  Skin:     General: Skin is warm  Capillary Refill: Capillary refill takes less than 2 seconds  Neurological:      Mental Status: She is alert  Psychiatric:         Mood and Affect: Mood normal          Behavior: Behavior normal          Thought Content:  Thought content normal          Judgment: Judgment normal

## 2022-10-28 ENCOUNTER — TELEMEDICINE (OUTPATIENT)
Dept: INTERNAL MEDICINE CLINIC | Age: 37
End: 2022-10-28
Payer: COMMERCIAL

## 2022-10-28 DIAGNOSIS — J06.9 UPPER RESPIRATORY TRACT INFECTION, UNSPECIFIED TYPE: Primary | ICD-10-CM

## 2022-10-28 PROBLEM — R10.9 ACUTE RIGHT FLANK PAIN: Status: RESOLVED | Noted: 2022-08-17 | Resolved: 2022-10-28

## 2022-10-28 LAB — BACTERIA THROAT CULT: NORMAL

## 2022-10-28 PROCEDURE — 99213 OFFICE O/P EST LOW 20 MIN: CPT | Performed by: NURSE PRACTITIONER

## 2022-10-28 RX ORDER — AMOXICILLIN AND CLAVULANATE POTASSIUM 875; 125 MG/1; MG/1
1 TABLET, FILM COATED ORAL EVERY 12 HOURS SCHEDULED
Qty: 14 TABLET | Refills: 0 | Status: SHIPPED | OUTPATIENT
Start: 2022-10-28 | End: 2022-11-04

## 2022-10-28 RX ORDER — BENZONATATE 200 MG/1
200 CAPSULE ORAL 3 TIMES DAILY PRN
Qty: 20 CAPSULE | Refills: 0 | Status: SHIPPED | OUTPATIENT
Start: 2022-10-28

## 2022-10-28 NOTE — ASSESSMENT & PLAN NOTE
- start Augmentin bid x 7 days   - continue flonase and tesaalon perles  - start Mucinex D  - rest, hydration, warm steam, warm showers, warm tea w   Honey  - follow up in office if symptoms worsen or fail to improve

## 2022-10-28 NOTE — PROGRESS NOTES
Virtual Regular Visit    Verification of patient location:    Patient is located in the following state in which I hold an active license [place]      Assessment/Plan:    Problem List Items Addressed This Visit        Respiratory    Upper respiratory tract infection - Primary     - start Augmentin bid x 7 days   - continue flonase and tesaalon perles  - start Mucinex D  - rest, hydration, warm steam, warm showers, warm tea w  Honey  - follow up in office if symptoms worsen or fail to improve          Relevant Medications    benzonatate (TESSALON) 200 MG capsule    amoxicillin-clavulanate (Augmentin) 875-125 mg per tablet               Reason for visit is   Chief Complaint   Patient presents with   • Virtual Brief Visit     VIRT mychart check in-  symptoms started 10/21-  sore throat, ear drainage, runny nose, congestion, cough(yellow mucus) body aches, chills, fever(Fever is gone)   Pt was seen at [facility] 10/[facility]- pt doesn't have transportation issues, unable to come in to come to office  • Virtual Regular Visit        Encounter provider CHELY Martinez    Provider located at [address] [id number]      Recent Visits  No visits were found meeting these conditions  Showing recent visits within past 7 days and meeting all other requirements  Today's Visits  Date Type Provider Dept   10/28/22 CHELY Grijalva [facility]   Showing today's visits and meeting all other requirements  Future Appointments  No visits were found meeting these conditions  Showing future appointments within next 150 days and meeting all other requirements       The patient was identified by name and date of birth  Dheeraj Hernandez was informed that this is a telemedicine visit and that the visit is being conducted through the [employer]  She agrees to proceed     My office door was closed  No one else was in the room  She acknowledged consent and understanding of privacy and security of the video platform  The patient has agreed to participate and understands they can discontinue the visit at any time  Patient is aware this is a billable service  Subjective  Telma Salmeron is a 30 y o  female presents to with cold symptoms  her son sick with cold  She had surgery on 10/20  For 3 days after her surgery she had a fever of 101-102  She contacted her OBGYN and was unable to be seen  She was advised to go to ED  She went to Urgent care on 10/26 and prescribed flonase and tessalon perles  She reports nasal congestion, sore throat, cough, ear pressure and drainage which is improving  She was tested for strep which was negative  Her fever is now resolved         HPI     Past Medical History:   Diagnosis Date   • Abdominal pain    • Abnormal Pap smear of cervix     -+HPV;   • Allergic    • Anemia    • Anxiety     and preop anxiety(provided emotional support) per pt--also has 3 yr old autistic son--his" Dad does help pt when needed-very supportive"   • Asthma    • Chest pain     per pt"has gone to ED and had heart checked" reports PCP feels related to anxiety"   • Chlamydia infection     treated in    • Depression     per pt "stopped taking her meds"   • Exercise involving stretching     also yoga   • Family history of breast cancer     "Mom and also has Ovarian Cancer"   • Heavy menses     "and clots"   • History of transfusion    • Indigestion    • Low back pain    • Medical marijuana use    • Migraine    • Migraine headache    • Motion sickness    • PONV (postoperative nausea and vomiting)    • PTSD (post-traumatic stress disorder)        Past Surgical History:   Procedure Laterality Date   •  SECTION  2018   • POLYPECTOMY N/A 10/20/2022    Procedure: POLYPECTOMY;  Surgeon: Zion Parker MD;  [facility];  Service: Gynecology   • [place] HYSTEROSCOPY,W/ENDO BX N/A 10/20/2022    Procedure: D&C W/HYSTEROSCOPY;  Surgeon: Etelvina Zimmer MD;  [facility];  Service: Gynecology       Current Outpatient Medications   Medication Sig Dispense Refill   • amoxicillin-clavulanate (Augmentin) 875-125 mg per tablet Take 1 tablet by mouth every 12 (twelve) hours for 7 days 14 tablet 0   • benzonatate (TESSALON) 200 MG capsule Take 1 capsule (200 mg total) by mouth 3 (three) times a day as needed for cough 20 capsule 0   • fluticasone (FLONASE) 50 mcg/act nasal spray 2 sprays into each nostril daily 15 8 mL 0     No current facility-administered medications for this visit  Allergies   Allergen Reactions   • Latex Hives and Throat Swelling       Review of Systems   Constitutional: Negative for fever (resolved)  HENT: Positive for congestion, ear discharge (right ear), ear pain (popping and muffled hearing  pain is improving), postnasal drip, rhinorrhea, sinus pressure, sinus pain and sore throat  Respiratory: Positive for cough (productive) and chest tightness  Negative for shortness of breath and wheezing  Gastrointestinal: Negative for diarrhea, nausea and vomiting  Video Exam    Vitals:       Physical Exam  Vitals reviewed  Constitutional:       General: She is not in acute distress  Appearance: Normal appearance  She is not diaphoretic  HENT:      Head: Normocephalic and atraumatic  Nose: Congestion present  Pulmonary:      Effort: Pulmonary effort is normal  No respiratory distress  Comments: No conversational dyspnea  Neurological:      Mental Status: She is alert  Mental status is at baseline     Psychiatric:         Mood and Affect: Mood normal          Behavior: Behavior normal           I spent 15 minutes directly with the patient during this visit

## 2022-11-04 ENCOUNTER — TELEPHONE (OUTPATIENT)
Dept: LABOR AND DELIVERY | Facility: HOSPITAL | Age: 37
End: 2022-11-04

## 2022-11-04 ENCOUNTER — TELEPHONE (OUTPATIENT)
Dept: GYNECOLOGIC ONCOLOGY | Facility: CLINIC | Age: 37
End: 2022-11-04

## 2022-11-04 DIAGNOSIS — C54.1 ENDOMETRIAL CANCER DETERMINED BY UTERINE BIOPSY (HCC): Primary | ICD-10-CM

## 2022-11-04 DIAGNOSIS — N85.02 COMPLEX ATYPICAL ENDOMETRIAL HYPERPLASIA: Primary | ICD-10-CM

## 2022-11-04 NOTE — TELEPHONE ENCOUNTER
I called the patient to review her pathology results from her D&C, hysteroscopy, polypectomy       I reviewed with the patient that her endometrial curetting and the mass show complex endometrial hyperplasia and an area concerning for adenocarcinoma  Pt crying and tearful and passed the phone to her   He requests to know next steps  Advised that I am putting in a referral to gyn oncology for further management  He requests this be done ASAP  Advised it would done a few minutes after I hang up  Pt reports she cannot talk right now and once it all sinks in she will call back

## 2022-11-07 ENCOUNTER — DOCUMENTATION (OUTPATIENT)
Dept: HEMATOLOGY ONCOLOGY | Facility: CLINIC | Age: 37
End: 2022-11-07

## 2022-11-07 ENCOUNTER — TELEPHONE (OUTPATIENT)
Dept: GYNECOLOGIC ONCOLOGY | Facility: CLINIC | Age: 37
End: 2022-11-07

## 2022-11-07 ENCOUNTER — CONSULT (OUTPATIENT)
Dept: GYNECOLOGIC ONCOLOGY | Facility: CLINIC | Age: 37
End: 2022-11-07

## 2022-11-07 VITALS
RESPIRATION RATE: 16 BRPM | HEART RATE: 80 BPM | OXYGEN SATURATION: 95 % | SYSTOLIC BLOOD PRESSURE: 112 MMHG | DIASTOLIC BLOOD PRESSURE: 70 MMHG | HEIGHT: 69 IN | BODY MASS INDEX: 27.25 KG/M2 | WEIGHT: 184 LBS | TEMPERATURE: 98 F

## 2022-11-07 DIAGNOSIS — Z80.9 FAMILY HISTORY OF CANCER: ICD-10-CM

## 2022-11-07 DIAGNOSIS — N85.02 COMPLEX ATYPICAL ENDOMETRIAL HYPERPLASIA: Primary | ICD-10-CM

## 2022-11-07 DIAGNOSIS — C54.1 ENDOMETRIAL CANCER DETERMINED BY UTERINE BIOPSY (HCC): ICD-10-CM

## 2022-11-07 RX ORDER — GABAPENTIN 100 MG/1
200 CAPSULE ORAL ONCE
OUTPATIENT
Start: 2022-11-07 | End: 2022-11-07

## 2022-11-07 RX ORDER — SODIUM CHLORIDE, SODIUM LACTATE, POTASSIUM CHLORIDE, CALCIUM CHLORIDE 600; 310; 30; 20 MG/100ML; MG/100ML; MG/100ML; MG/100ML
125 INJECTION, SOLUTION INTRAVENOUS CONTINUOUS
OUTPATIENT
Start: 2022-11-07

## 2022-11-07 RX ORDER — CEFAZOLIN SODIUM 1 G/50ML
1000 SOLUTION INTRAVENOUS ONCE
OUTPATIENT
Start: 2022-11-07 | End: 2022-11-07

## 2022-11-07 RX ORDER — HEPARIN SODIUM 5000 [USP'U]/ML
5000 INJECTION, SOLUTION INTRAVENOUS; SUBCUTANEOUS
OUTPATIENT
Start: 2022-11-07 | End: 2022-11-08

## 2022-11-07 RX ORDER — ACETAMINOPHEN 325 MG/1
975 TABLET ORAL ONCE
OUTPATIENT
Start: 2022-11-07 | End: 2022-11-07

## 2022-11-07 NOTE — PROGRESS NOTES
Assessment/Plan:    Problem List Items Addressed This Visit        Genitourinary    Complex atypical endometrial hyperplasia - Primary     42-year-old  5 para 1 who does not desire future fertility with biopsy-proven complex atypical hyperplasia bordering on grade 1 endometrial cancer  She has a family history of early-onset malignancy  Her performance status is 0   1  I discussed the pathophysiology of complex atypical hyperplasia and its association with endometrial cancer  She understands that there is an approximate 40-60% risk of having concurrent endometrial cancer particularly with the diagnosis, "bordering on endometrial cancer "  2  I discussed treatment options for complex atypical hyperplasia/grade 1 endometrial cancer including fertility sparing management and definitive surgical management  For fertility sparing management, MRI of the pelvis will be performed followed by megace at 80 mg twice daily with repeat endometrial biopsies approximately every 3 months  We discussed the high response rate to megace provided there is no invasive disease  She stated that she desired definitive surgical management  3  I discussed the risks and benefits of robotic assisted total laparoscopic hysterectomy, bilateral salpingectomy, possible oophorectomy, lymph node dissection, possible exploratory laparotomy and all other indicated procedures  She understands the risks and benefits of the operation agrees to proceed as outlined  Consent for surgery was obtained by me in the office  4  She understands that adjuvant treatment is based on final pathology results but that adjuvant therapy is rarely prescribed for a preoperative diagnosis of complex atypical hyperplasia bordering on endometrial cancer  Thank you for the courtesy of this consultation  All questions were answered by the end of the visit           Relevant Orders    Ambulatory Referral to Oncology Genetics    Case request operating room: HYSTERECTOMY LAPAROSCOPIC TOTAL ([address] [place] [address]) W/ ROBOTICS (Completed)    Type and screen    HEMOGLOBIN A1C W/ EAG ESTIMATION    XR chest pa & lateral       Other    Family history of cancer    Relevant Orders    Ambulatory Referral to Oncology Genetics      Other Visit Diagnoses     Endometrial cancer determined by uterine biopsy ([address] )                  CHIEF COMPLAINT:  Biopsy-proven complex atypical hyperplasia bordering on grade 1 endometrial cancer        Patient ID: Omar Myers is a 30 y o  female  42-year-old  5 para 1 with a family history of uterine and thyroid cancer presented initially with abnormal uterine bleeding  She had a pelvic ultrasound on 2022 that revealed the uterus to measure 9 3 x 5 4 cm with an endometrial polyp with vascularity  The ovaries were grossly normal   There was no free fluid  She had a CT renal stone protocol on 2022 that did not reveal any evidence of lymphadenopathy  There is a small amount of pelvic ascites present  Given the abnormal bleeding and endometrial polyp, she was taken for a dilation curettage, hysteroscopy on 10/20/2022  Final pathology was consistent with complex atypical hyperplasia bordering on grade 1 endometrial cancer  She is referred as a consultation from Dr Riky Cueva to discuss treatment options for her complex atypical hyperplasia  She is currently not having significant bleeding or pelvic pain  Review of Systems   Constitutional: Negative for activity change and unexpected weight change  HENT: Negative  Eyes: Negative  Respiratory: Negative  Cardiovascular: Negative  Gastrointestinal: Negative for abdominal distention and abdominal pain  Endocrine: Negative  Genitourinary: Negative for pelvic pain and vaginal bleeding  Musculoskeletal: Negative  Skin: Negative  Allergic/Immunologic: Negative  Neurological: Negative  Hematological: Negative  Psychiatric/Behavioral: Negative          Current Outpatient Medications   Medication Sig Dispense Refill   • fluticasone (FLONASE) 50 mcg/act nasal spray 2 sprays into each nostril daily 15 8 mL 0   • benzonatate (TESSALON) 200 MG capsule Take 1 capsule (200 mg total) by mouth 3 (three) times a day as needed for cough (Patient not taking: Reported on 2022) 20 capsule 0     No current facility-administered medications for this visit         Allergies   Allergen Reactions   • Latex Hives and Throat Swelling       Past Medical History:   Diagnosis Date   • Abdominal pain    • Abnormal Pap smear of cervix     -+HPV;   • Allergic    • Anemia    • Anxiety     and preop anxiety(provided emotional support) per pt--also has 3 yr old autistic son--his" Dad does help pt when needed-very supportive"   • Asthma    • Chest pain     per pt"has gone to ED and had heart checked" reports PCP feels related to anxiety"   • Chlamydia infection     treated in    • Depression     per pt "stopped taking her meds"   • Exercise involving stretching     also yoga   • Family history of breast cancer     "Mom and also has Ovarian Cancer"   • Heavy menses     "and clots"   • History of transfusion    • Indigestion    • Low back pain    • Medical marijuana use    • Migraine    • Migraine headache    • Motion sickness    • PONV (postoperative nausea and vomiting)    • PTSD (post-traumatic stress disorder)        Past Surgical History:   Procedure Laterality Date   •  SECTION  2018   • POLYPECTOMY N/A 10/20/2022    Procedure: POLYPECTOMY;  Surgeon: Fatuma Reagan MD;  [facility];  Service: Gynecology   • [place] HYSTEROSCOPY,W/ENDO BX N/A 10/20/2022    Procedure: D&C W/HYSTEROSCOPY;  Surgeon: Fatuma Reagan MD;  [facility];  Service: Gynecology       OB History        5    Para   1    Term   0       1    AB   4    Living   1       SAB   4    IAB        Ectopic        Multiple        Live Births   1           Obstetric Comments   Menarche: 10    Menses: 28/5-6/overnight pad every 2 hours x 4 days and then maxi pad every 4 hours             Family History   Problem Relation Age of Onset   • Ovarian cancer Mother 36   • Breast cancer Mother 44   • BRCA 1/2 Mother         positive, unsure if BRCA 1 or 2   • Cirrhosis Father    • Hepatitis Father         unsure of which type   • Ovarian cancer Sister 16        BRCA negative   • Leukemia Sister 8   • Lung cancer Sister 12   • Fibromyalgia Sister    • Asthma Sister    • Diabetes type II Brother    • Diabetes type II Brother    • No Known Problems Brother    • Colon cancer Brother          age 47   • Multiple sclerosis Brother    • No Known Problems Maternal Grandmother    • No Known Problems Maternal Grandfather    • No Known Problems Paternal Grandmother    • No Known Problems Paternal Grandfather    • No Known Problems Paternal Aunt    • No Known Problems Paternal Aunt    • No Known Problems Paternal Aunt    • No Known Problems Paternal Aunt    • No Known Problems Paternal Aunt    • No Known Problems Paternal Aunt    • No Known Problems Paternal Aunt        The following portions of the patient's history were reviewed and updated as appropriate: allergies, current medications, past family history, past medical history, past social history, past surgical history and problem list       Objective:    Blood pressure 112/70, pulse 80, temperature 98 °F (36 7 °C), temperature source Temporal, resp  rate 16, height 5' 9" (1 753 m), weight 83 5 kg (184 lb), SpO2 95 %  Body mass index is 27 17 kg/m²  Physical Exam  Vitals reviewed  Constitutional:       General: She is not in acute distress  Appearance: Normal appearance  She is normal weight  She is not ill-appearing or toxic-appearing  HENT:      Head: Normocephalic and atraumatic  Mouth/Throat:      Mouth: Mucous membranes are moist    Eyes:      General: No scleral icterus  Right eye: No discharge           Left eye: No discharge  Conjunctiva/sclera: Conjunctivae normal    Cardiovascular:      Rate and Rhythm: Normal rate and regular rhythm  Heart sounds: Normal heart sounds  Pulmonary:      Effort: Pulmonary effort is normal  No respiratory distress  Breath sounds: Normal breath sounds  No stridor  No wheezing or rhonchi  Abdominal:      General: Abdomen is flat  There is no distension  Palpations: Abdomen is soft  There is no mass  Tenderness: There is no abdominal tenderness  Hernia: No hernia is present  Comments: Low transverse incision   Genitourinary:     Comments: Deferred per patient  Musculoskeletal:      Cervical back: Neck supple  Right lower leg: No edema  Left lower leg: No edema  Lymphadenopathy:      Cervical: No cervical adenopathy  Skin:     General: Skin is warm and dry  Coloration: Skin is not jaundiced  Findings: No rash  Neurological:      General: No focal deficit present  Mental Status: She is alert and oriented to person, place, and time  Cranial Nerves: No cranial nerve deficit  Motor: No weakness  Gait: Gait normal    Psychiatric:         Mood and Affect: Mood normal          Behavior: Behavior normal          Thought Content:  Thought content normal          Judgment: Judgment normal            No results found for:   Lab Results   Component Value Date    WBC 7 31 10/20/2022    HGB 12 4 10/20/2022    HCT 38 4 10/20/2022    MCV 91 10/20/2022     10/20/2022     Lab Results   Component Value Date     10/03/2014    K 3 5 10/20/2022     10/20/2022    CO2 28 10/20/2022    ANIONGAP 14 09/08/2015    BUN 7 10/20/2022    CREATININE 0 66 10/20/2022    GLUCOSE 101 10/03/2014    GLUF 104 (H) 10/20/2022    CALCIUM 9 0 10/20/2022    AST 15 09/23/2021    ALT 20 09/23/2021    ALKPHOS 69 09/23/2021    PROT 7 2 10/03/2014    BILITOT 0 8 10/03/2014    EGFR 114 10/20/2022

## 2022-11-07 NOTE — H&P (VIEW-ONLY)
Assessment/Plan:    Problem List Items Addressed This Visit        Genitourinary    Complex atypical endometrial hyperplasia - Primary     43-year-old  5 para 1 who does not desire future fertility with biopsy-proven complex atypical hyperplasia bordering on grade 1 endometrial cancer  She has a family history of early-onset malignancy  Her performance status is 0   1  I discussed the pathophysiology of complex atypical hyperplasia and its association with endometrial cancer  She understands that there is an approximate 40-60% risk of having concurrent endometrial cancer particularly with the diagnosis, "bordering on endometrial cancer "  2  I discussed treatment options for complex atypical hyperplasia/grade 1 endometrial cancer including fertility sparing management and definitive surgical management  For fertility sparing management, MRI of the pelvis will be performed followed by megace at 80 mg twice daily with repeat endometrial biopsies approximately every 3 months  We discussed the high response rate to megace provided there is no invasive disease  She stated that she desired definitive surgical management  3  I discussed the risks and benefits of robotic assisted total laparoscopic hysterectomy, bilateral salpingectomy, possible oophorectomy, lymph node dissection, possible exploratory laparotomy and all other indicated procedures  She understands the risks and benefits of the operation agrees to proceed as outlined  Consent for surgery was obtained by me in the office  4  She understands that adjuvant treatment is based on final pathology results but that adjuvant therapy is rarely prescribed for a preoperative diagnosis of complex atypical hyperplasia bordering on endometrial cancer  Thank you for the courtesy of this consultation  All questions were answered by the end of the visit           Relevant Orders    Ambulatory Referral to Oncology Genetics    Case request operating room: HYSTERECTOMY LAPAROSCOPIC TOTAL ([address] [place] [address]) W/ ROBOTICS (Completed)    Type and screen    HEMOGLOBIN A1C W/ EAG ESTIMATION    XR chest pa & lateral       Other    Family history of cancer    Relevant Orders    Ambulatory Referral to Oncology Genetics      Other Visit Diagnoses     Endometrial cancer determined by uterine biopsy ([facility] [address] )                  CHIEF COMPLAINT:  Biopsy-proven complex atypical hyperplasia bordering on grade 1 endometrial cancer        Patient ID: Gladys Dunaway is a 30 y o  female  28-year-old  5 para 1 with a family history of uterine and thyroid cancer presented initially with abnormal uterine bleeding  She had a pelvic ultrasound on 2022 that revealed the uterus to measure 9 3 x 5 4 cm with an endometrial polyp with vascularity  The ovaries were grossly normal   There was no free fluid  She had a CT renal stone protocol on 2022 that did not reveal any evidence of lymphadenopathy  There is a small amount of pelvic ascites present  Given the abnormal bleeding and endometrial polyp, she was taken for a dilation curettage, hysteroscopy on 10/20/2022  Final pathology was consistent with complex atypical hyperplasia bordering on grade 1 endometrial cancer  She is referred as a consultation from Dr Steph Robert to discuss treatment options for her complex atypical hyperplasia  She is currently not having significant bleeding or pelvic pain  Review of Systems   Constitutional: Negative for activity change and unexpected weight change  HENT: Negative  Eyes: Negative  Respiratory: Negative  Cardiovascular: Negative  Gastrointestinal: Negative for abdominal distention and abdominal pain  Endocrine: Negative  Genitourinary: Negative for pelvic pain and vaginal bleeding  Musculoskeletal: Negative  Skin: Negative  Allergic/Immunologic: Negative  Neurological: Negative  Hematological: Negative  Psychiatric/Behavioral: Negative          Current Outpatient Medications   Medication Sig Dispense Refill   • fluticasone (FLONASE) 50 mcg/act nasal spray 2 sprays into each nostril daily 15 8 mL 0   • benzonatate (TESSALON) 200 MG capsule Take 1 capsule (200 mg total) by mouth 3 (three) times a day as needed for cough (Patient not taking: Reported on 2022) 20 capsule 0     No current facility-administered medications for this visit         Allergies   Allergen Reactions   • Latex Hives and Throat Swelling       Past Medical History:   Diagnosis Date   • Abdominal pain    • Abnormal Pap smear of cervix     -+HPV;   • Allergic    • Anemia    • Anxiety     and preop anxiety(provided emotional support) per pt--also has 3 yr old autistic son--his" Dad does help pt when needed-very supportive"   • Asthma    • Chest pain     per pt"has gone to ED and had heart checked" reports PCP feels related to anxiety"   • Chlamydia infection     treated in    • Depression     per pt "stopped taking her meds"   • Exercise involving stretching     also yoga   • Family history of breast cancer     "Mom and also has Ovarian Cancer"   • Heavy menses     "and clots"   • History of transfusion    • Indigestion    • Low back pain    • Medical marijuana use    • Migraine    • Migraine headache    • Motion sickness    • PONV (postoperative nausea and vomiting)    • PTSD (post-traumatic stress disorder)        Past Surgical History:   Procedure Laterality Date   •  SECTION  2018   • POLYPECTOMY N/A 10/20/2022    Procedure: POLYPECTOMY;  Surgeon: Hiram Castañeda MD;  Location: [facility];  Service: Gynecology   • FL HYSTEROSCOPY,W/ENDO BX N/A 10/20/2022    Procedure: D&C W/HYSTEROSCOPY;  Surgeon: Hiram Castañeda MD;  Location: [facility];  Service: Gynecology       OB History        5    Para   1    Term   0       1    AB   4    Living   1       SAB   4    IAB        Ectopic        Multiple        Live Births   1           Obstetric Comments   Menarche: 10    Menses: 28/5-6/overnight pad every 2 hours x 4 days and then maxi pad every 4 hours             Family History   Problem Relation Age of Onset   • Ovarian cancer Mother 36   • Breast cancer Mother 44   • BRCA 1/2 Mother         positive, unsure if BRCA 1 or 2   • Cirrhosis Father    • Hepatitis Father         unsure of which type   • Ovarian cancer Sister 16        BRCA negative   • Leukemia Sister 8   • Lung cancer Sister 12   • Fibromyalgia Sister    • Asthma Sister    • Diabetes type II Brother    • Diabetes type II Brother    • No Known Problems Brother    • Colon cancer Brother          age 47   • Multiple sclerosis Brother    • No Known Problems Maternal Grandmother    • No Known Problems Maternal Grandfather    • No Known Problems Paternal Grandmother    • No Known Problems Paternal Grandfather    • No Known Problems Paternal Aunt    • No Known Problems Paternal Aunt    • No Known Problems Paternal Aunt    • No Known Problems Paternal Aunt    • No Known Problems Paternal Aunt    • No Known Problems Paternal Aunt    • No Known Problems Paternal Aunt        The following portions of the patient's history were reviewed and updated as appropriate: allergies, current medications, past family history, past medical history, past social history, past surgical history and problem list       Objective:    Blood pressure 112/70, pulse 80, temperature 98 °F (36 7 °C), temperature source Temporal, resp  rate 16, height 5' 9" (1 753 m), weight 83 5 kg (184 lb), SpO2 95 %  Body mass index is 27 17 kg/m²  Physical Exam  Vitals reviewed  Constitutional:       General: She is not in acute distress  Appearance: Normal appearance  She is normal weight  She is not ill-appearing or toxic-appearing  HENT:      Head: Normocephalic and atraumatic  Mouth/Throat:      Mouth: Mucous membranes are moist    Eyes:      General: No scleral icterus  Right eye: No discharge           Left eye: No discharge  Conjunctiva/sclera: Conjunctivae normal    Cardiovascular:      Rate and Rhythm: Normal rate and regular rhythm  Heart sounds: Normal heart sounds  Pulmonary:      Effort: Pulmonary effort is normal  No respiratory distress  Breath sounds: Normal breath sounds  No stridor  No wheezing or rhonchi  Abdominal:      General: Abdomen is flat  There is no distension  Palpations: Abdomen is soft  There is no mass  Tenderness: There is no abdominal tenderness  Hernia: No hernia is present  Comments: Low transverse incision   Genitourinary:     Comments: Deferred per patient  Musculoskeletal:      Cervical back: Neck supple  Right lower leg: No edema  Left lower leg: No edema  Lymphadenopathy:      Cervical: No cervical adenopathy  Skin:     General: Skin is warm and dry  Coloration: Skin is not jaundiced  Findings: No rash  Neurological:      General: No focal deficit present  Mental Status: She is alert and oriented to person, place, and time  Cranial Nerves: No cranial nerve deficit  Motor: No weakness  Gait: Gait normal    Psychiatric:         Mood and Affect: Mood normal          Behavior: Behavior normal          Thought Content:  Thought content normal          Judgment: Judgment normal            No results found for:   Lab Results   Component Value Date    WBC 7 31 10/20/2022    HGB 12 4 10/20/2022    HCT 38 4 10/20/2022    MCV 91 10/20/2022     10/20/2022     Lab Results   Component Value Date     10/03/2014    K 3 5 10/20/2022     10/20/2022    CO2 28 10/20/2022    ANIONGAP 14 09/08/2015    BUN 7 10/20/2022    CREATININE 0 66 10/20/2022    GLUCOSE 101 10/03/2014    GLUF 104 (H) 10/20/2022    CALCIUM 9 0 10/20/2022    AST 15 09/23/2021    ALT 20 09/23/2021    ALKPHOS 69 09/23/2021    PROT 7 2 10/03/2014    BILITOT 0 8 10/03/2014    EGFR 114 10/20/2022

## 2022-11-07 NOTE — ASSESSMENT & PLAN NOTE
82-year-old  5 para 1 who does not desire future fertility with biopsy-proven complex atypical hyperplasia bordering on grade 1 endometrial cancer  She has a family history of early-onset malignancy  Her performance status is 0   1  I discussed the pathophysiology of complex atypical hyperplasia and its association with endometrial cancer  She understands that there is an approximate 40-60% risk of having concurrent endometrial cancer particularly with the diagnosis, "bordering on endometrial cancer "  2  I discussed treatment options for complex atypical hyperplasia/grade 1 endometrial cancer including fertility sparing management and definitive surgical management  For fertility sparing management, MRI of the pelvis will be performed followed by megace at 80 mg twice daily with repeat endometrial biopsies approximately every 3 months  We discussed the high response rate to megace provided there is no invasive disease  She stated that she desired definitive surgical management  3  I discussed the risks and benefits of robotic assisted total laparoscopic hysterectomy, bilateral salpingectomy, possible oophorectomy, lymph node dissection, possible exploratory laparotomy and all other indicated procedures  She understands the risks and benefits of the operation agrees to proceed as outlined  Consent for surgery was obtained by me in the office  4  She understands that adjuvant treatment is based on final pathology results but that adjuvant therapy is rarely prescribed for a preoperative diagnosis of complex atypical hyperplasia bordering on endometrial cancer  Thank you for the courtesy of this consultation  All questions were answered by the end of the visit

## 2022-11-07 NOTE — TELEPHONE ENCOUNTER
Call placed to patient to move new patient consult to sooner time on 11/7/22, LMOM  Please connect with Latricia Press at time of call back

## 2022-11-14 ENCOUNTER — TELEPHONE (OUTPATIENT)
Dept: GENETICS | Facility: CLINIC | Age: 37
End: 2022-11-14

## 2022-11-14 NOTE — TELEPHONE ENCOUNTER
I sent the following Expert TAt message regarding the 2nd referral to Genetics  Good Afternoon Rossalyn,    We received a referral from Dr Milla Rogers to schedule an appointment with one of our genetic counselors  Upon review of your chart it appears that you met with one of our genetic counselors, Gladys Garcia, over the phone on 9/14/22 however you were unable to make the appointment with our Genetic MA to have your blood drawn  Since Dr Milla Rogers sent a new referral for genetic testing, I wanted to follow-up to see if you were still interested in genetic testing  If you would like to move forward with testing please reach out to our main number [phone]        Sincerely,    [facility] Cancer Risk and Genetics Program

## 2022-11-16 ENCOUNTER — DOCUMENTATION (OUTPATIENT)
Dept: GENETICS | Facility: CLINIC | Age: 37
End: 2022-11-16

## 2022-11-30 NOTE — PRE-PROCEDURE INSTRUCTIONS
No outpatient medications have been marked as taking for the 12/6/22 encounter [facility] Encounter)  - Reviewed with patient, in detail, instructions from "My Surgical Experience"  - Instructed to avoid all OTC vitamins/supplements and NSAIDS for one week prior to surgery per anesthesia guidelines  Tylenol ok to take PRN  - Advised patient nothing eat or drink after midnight prior to surgery, except medications that he/she is to take morning DOS with only small sip of water     - Advised patient that Mike Bartholomew will call with surgery arrival time and hospital directions the business day prior to surgery  Patient verbalized understanding of current visitor restrictions/masking guidelines and advised that he/she can confirm these at time of arrival call with Mike Bartholomew  Pt was given 3 carb drinks  Verbalized understanding of instructions given  - Patient verbalized understanding and knows to call surgeon's office with any additional questions prior to surgery  Instructed to call surgeon's office in meantime with any new illnesses/exposure, patient verbalized understanding

## 2022-12-01 ENCOUNTER — LAB REQUISITION (OUTPATIENT)
Dept: LAB | Facility: HOSPITAL | Age: 37
End: 2022-12-01

## 2022-12-01 ENCOUNTER — APPOINTMENT (OUTPATIENT)
Dept: LAB | Facility: HOSPITAL | Age: 37
End: 2022-12-01

## 2022-12-01 ENCOUNTER — ANESTHESIA EVENT (OUTPATIENT)
Dept: PERIOP | Facility: HOSPITAL | Age: 37
End: 2022-12-01

## 2022-12-01 ENCOUNTER — HOSPITAL ENCOUNTER (OUTPATIENT)
Dept: RADIOLOGY | Facility: HOSPITAL | Age: 37
Discharge: HOME | End: 2022-12-01

## 2022-12-01 DIAGNOSIS — N85.02 COMPLEX ATYPICAL ENDOMETRIAL HYPERPLASIA: ICD-10-CM

## 2022-12-01 DIAGNOSIS — Z01.818 PREOP EXAMINATION: ICD-10-CM

## 2022-12-01 DIAGNOSIS — Z01.818 ENCOUNTER FOR OTHER PREPROCEDURAL EXAMINATION: ICD-10-CM

## 2022-12-02 LAB
ABO GROUP BLD: NORMAL
BLD GP AB SCN SERPL QL: NEGATIVE
EST. AVERAGE GLUCOSE BLD GHB EST-MCNC: 88 MG/DL
HBA1C MFR BLD: 4.7 %
RH BLD: POSITIVE
SPECIMEN EXPIRATION DATE: NORMAL

## 2022-12-05 PROBLEM — Z98.890 S/P DILATION AND CURETTAGE: Status: RESOLVED | Noted: 2022-10-20 | Resolved: 2022-12-05

## 2022-12-05 NOTE — DISCHARGE INSTRUCTIONS
[facility]  Dillon Peñaloza, Florencia Song  [phone]    Hysterectomy Discharge Instructions    Post-Gynecologic Surgery Discharge Instructions:  1  No heavy lifting more than one full gallon of milk (about 8 lbs) for 1 week  2  Nothing in the vagina for 6 weeks, or until cleared at your post-operative visit  3  You may take stairs one at a time, touching each step with both feet for the first few days, then as tolerated  4  Call the office for fever greater than 100  4'F, heavy vaginal bleeding, or increasing pain  5  Activity as tolerated  6  Avoid driving if taking narcotic pain medications (Roxicodone, Percocet, Vicodin)  Post Operative Pain Management:  For pain, take 650 mg of tylenol every 6 hours  For cramping, take 600 mg Ibuprofen every 6 hours to relieve  You may alternate these and take them "around the clock" to stay ahead of pain  For example, take 650mg of tylenol at 9 AM, then 600mg of ibuprofen at 12 PM, then 650 of tylenol at 3 PM, and so forth  If you have any questions regarding your prescriptions please call your doctor  WHAT YOU NEED TO KNOW:   A hysterectomy is surgery to remove your uterus  Your ovaries, fallopian tubes, cervix, or part of your vagina may also need to be removed  The organs and tissue that will be removed depends on your medical condition  After a hysterectomy, you will not be able to become pregnant  If your ovaries are removed, you will go through menopause if you have not already  DISCHARGE INSTRUCTIONS:   Contact your doctor at the number above if:   You have a fever over 101o  You have nausea or are vomiting that does not improve after a light meal    Your pain is getting worse, even after you take medicine  You feel pain or burning when you urinate, or you have trouble urinating  You have pus or a foul-smelling odor coming from your vagina      Your wound is red, swollen, or draining pus  You see new or an increased amount of bright red blood coming from your vagina or your incisions  You have questions or concerns about your condition or care  Seek care immediately:   Your arm or leg feels warm, tender, and painful  It may look swollen and red  You have increasing abdominal or pelvic pain  You have heavy vaginal bleeding that fills 1 or more sanitary pads in 1 hour  Call 911 for any of the following: You feel lightheaded, short of breath, and have chest pain  You cough up blood  Medicines: You may need any of the following:  Prescription medicine may be given  You may receive a prescription for pain medication or be advised to use over the counter (OTC) pain medication such as acetaminophen (Tylenol) or ibuprofen (Advil, Motrin)  Ask your healthcare provider how to take this medicine safely  NSAIDs , such as ibuprofen, help decrease swelling, pain, and fever  NSAIDs can cause stomach bleeding or kidney problems in certain people  If you take blood thinner medicine, always ask your healthcare provider if NSAIDs are safe for you  Always read the medicine label and follow directions  Stool softeners help treat or prevent constipation  Take your medicine as directed  Contact your healthcare provider if you think your medicine is not helping or if you have side effects  Tell him or her if you are allergic to any medicine  Keep a list of the medicines, vitamins, and herbs you take  Include the amounts, and when and why you take them  Bring the list or the pill bottles to follow-up visits  Carry your medicine list with you in case of an emergency  Activity:   Rest as needed  Get up and move around as directed to help prevent blood clots  Start with short walks and slowly increase the distance every day  Limit the number of times you climb stairs to 2 times each day for the first week  Plan most of your daily activities on one level of your home        Do not lift objects heavier than 10 pounds for 6 weeks  Avoid strenuous activity for 2 weeks  Do not strain during bowel movements  High-fiber foods and extra liquids can help you prevent constipation  Examples of high-fiber foods are fruit and bran  Prune juice and water are good liquids to drink  Do not have sex, use tampons, or douche for up to 8 weeks  You may shower as soon as the day after surgery  Tub baths can be taken starting 2 weeks after surgery  Do not go into pools or hot tubs until cleared by your doctor  Ask when it is safe for you to drive  It is generally safe to drive after 2 weeks and when no longer taking prescription pain medication  Ask when you may return to work and to other regular activities  Wound care: Care for your abdominal incisions as directed  Carefully wash around the wound with soap and water  If you have Hibiclens or medicated soap that you were instructed to use before surgery, you may use that to wash with for up to 2 days after surgery  If not, any mild non-scented, non-abrasive soap is safe  It is okay to let the soap and water run over your incision  Do not scrub your incision  Dry the area and put on new, clean bandages as directed  Change your bandages when they get wet or dirty  If you have strips of medical tape, let them fall off on their own  It may take 7 to 14 days for them to fall off  Check your incision every day for redness, swelling, or pus  Deep breathing: Take deep breaths and cough 10 times each hour  This will decrease your risk for a lung infection  Take a deep breath and hold it for as long as you can  Let the air out and then cough strongly  Deep breaths help open your airway  You may be given an incentive spirometer to help you take deep breaths  Put the plastic piece in your mouth and take a slow, deep breath, then let the air out and cough  Repeat these steps 10 times every hour  Get support:  This surgery may be life-changing for you and your family  You will no longer be able to get pregnant  Sudden changes in the levels of your hormones may occur and cause mood swings and depression  You may feel angry, sad, or frightened, or cry frequently and unexpectedly  These feelings are normal  Talk to your healthcare provider about where you can get support  You can also ask if hormone replacement medicine is right for you  Follow up with your healthcare provider or gynecologist as directed: You may need to return to have stitches removed, and for other tests  Write down your questions so you remember to ask them during your visits  © 2017 [employer] [employer] Information is for End User's use only and may not be sold, redistributed or otherwise used for commercial purposes  All illustrations and images included in CareNotes® are the copyrighted property of A D [employer] M [employer] Inc  or Don[employer] Polanco  The above information is an  only  It is not intended as medical advice for individual conditions or treatments  Talk to your doctor, nurse or pharmacist before following any medical regimen to see if it is safe and effective for you

## 2022-12-06 ENCOUNTER — HOSPITAL ENCOUNTER (OUTPATIENT)
Facility: HOSPITAL | Age: 37
Setting detail: OUTPATIENT SURGERY
Discharge: HOME | End: 2022-12-06
Attending: OBSTETRICS & GYNECOLOGY | Admitting: OBSTETRICS & GYNECOLOGY

## 2022-12-06 ENCOUNTER — ANESTHESIA (OUTPATIENT)
Dept: PERIOP | Facility: HOSPITAL | Age: 37
End: 2022-12-06

## 2022-12-06 VITALS
WEIGHT: 184 LBS | HEART RATE: 75 BPM | DIASTOLIC BLOOD PRESSURE: 73 MMHG | HEIGHT: 69 IN | RESPIRATION RATE: 18 BRPM | SYSTOLIC BLOOD PRESSURE: 124 MMHG | BODY MASS INDEX: 27.25 KG/M2 | TEMPERATURE: 98.4 F | OXYGEN SATURATION: 100 %

## 2022-12-06 DIAGNOSIS — N85.02 COMPLEX ATYPICAL ENDOMETRIAL HYPERPLASIA: ICD-10-CM

## 2022-12-06 PROBLEM — Z98.890 S/P ROBOT-ASSISTED SURGICAL PROCEDURE: Status: ACTIVE | Noted: 2022-12-06

## 2022-12-06 LAB
EXT PREGNANCY TEST URINE: NEGATIVE
EXT. CONTROL: NORMAL
GLUCOSE SERPL-MCNC: 146 MG/DL (ref 65–140)

## 2022-12-06 RX ORDER — METOCLOPRAMIDE HYDROCHLORIDE 5 MG/ML
10 INJECTION INTRAMUSCULAR; INTRAVENOUS ONCE AS NEEDED
Status: COMPLETED | OUTPATIENT
Start: 2022-12-06 | End: 2022-12-06

## 2022-12-06 RX ORDER — GLYCOPYRROLATE 0.2 MG/ML
INJECTION INTRAMUSCULAR; INTRAVENOUS AS NEEDED
Status: DISCONTINUED | OUTPATIENT
Start: 2022-12-06 | End: 2022-12-06

## 2022-12-06 RX ORDER — GABAPENTIN 100 MG/1
200 CAPSULE ORAL ONCE
Status: COMPLETED | OUTPATIENT
Start: 2022-12-06 | End: 2022-12-06

## 2022-12-06 RX ORDER — OXYCODONE HYDROCHLORIDE 5 MG/1
5 TABLET ORAL EVERY 4 HOURS PRN
Status: DISCONTINUED | OUTPATIENT
Start: 2022-12-06 | End: 2022-12-06 | Stop reason: HOSPADM

## 2022-12-06 RX ORDER — SODIUM CHLORIDE, SODIUM LACTATE, POTASSIUM CHLORIDE, CALCIUM CHLORIDE 600; 310; 30; 20 MG/100ML; MG/100ML; MG/100ML; MG/100ML
125 INJECTION, SOLUTION INTRAVENOUS CONTINUOUS
Status: DISCONTINUED | OUTPATIENT
Start: 2022-12-06 | End: 2022-12-06 | Stop reason: HOSPADM

## 2022-12-06 RX ORDER — MIDAZOLAM HYDROCHLORIDE 2 MG/2ML
INJECTION, SOLUTION INTRAMUSCULAR; INTRAVENOUS AS NEEDED
Status: DISCONTINUED | OUTPATIENT
Start: 2022-12-06 | End: 2022-12-06

## 2022-12-06 RX ORDER — OXYCODONE HYDROCHLORIDE 5 MG/1
10 TABLET ORAL EVERY 4 HOURS PRN
Status: DISCONTINUED | OUTPATIENT
Start: 2022-12-06 | End: 2022-12-06 | Stop reason: HOSPADM

## 2022-12-06 RX ORDER — OXYCODONE HYDROCHLORIDE 5 MG/1
5 TABLET ORAL EVERY 4 HOURS PRN
Qty: 10 TABLET | Refills: 0 | Status: SHIPPED | OUTPATIENT
Start: 2022-12-06 | End: 2022-12-16

## 2022-12-06 RX ORDER — ACETAMINOPHEN 325 MG/1
975 TABLET ORAL ONCE
Status: COMPLETED | OUTPATIENT
Start: 2022-12-06 | End: 2022-12-06

## 2022-12-06 RX ORDER — CEFAZOLIN SODIUM 1 G/50ML
1000 SOLUTION INTRAVENOUS ONCE
Status: COMPLETED | OUTPATIENT
Start: 2022-12-06 | End: 2022-12-06

## 2022-12-06 RX ORDER — SENNOSIDES 8.6 MG
1 TABLET ORAL DAILY
Status: DISCONTINUED | OUTPATIENT
Start: 2022-12-07 | End: 2022-12-06 | Stop reason: HOSPADM

## 2022-12-06 RX ORDER — HYDROMORPHONE HYDROCHLORIDE 2 MG/ML
INJECTION, SOLUTION INTRAMUSCULAR; INTRAVENOUS; SUBCUTANEOUS AS NEEDED
Status: DISCONTINUED | OUTPATIENT
Start: 2022-12-06 | End: 2022-12-06

## 2022-12-06 RX ORDER — BUPIVACAINE HYDROCHLORIDE 2.5 MG/ML
INJECTION, SOLUTION EPIDURAL; INFILTRATION; INTRACAUDAL AS NEEDED
Status: DISCONTINUED | OUTPATIENT
Start: 2022-12-06 | End: 2022-12-06 | Stop reason: HOSPADM

## 2022-12-06 RX ORDER — FENTANYL CITRATE/PF 50 MCG/ML
25 SYRINGE (ML) INJECTION
Status: DISCONTINUED | OUTPATIENT
Start: 2022-12-06 | End: 2022-12-06 | Stop reason: HOSPADM

## 2022-12-06 RX ORDER — ROCURONIUM BROMIDE 10 MG/ML
INJECTION, SOLUTION INTRAVENOUS AS NEEDED
Status: DISCONTINUED | OUTPATIENT
Start: 2022-12-06 | End: 2022-12-06

## 2022-12-06 RX ORDER — HYDROMORPHONE HCL/PF 1 MG/ML
0.5 SYRINGE (ML) INJECTION
Status: DISCONTINUED | OUTPATIENT
Start: 2022-12-06 | End: 2022-12-06 | Stop reason: HOSPADM

## 2022-12-06 RX ORDER — IBUPROFEN 600 MG/1
600 TABLET ORAL EVERY 6 HOURS PRN
Status: DISCONTINUED | OUTPATIENT
Start: 2022-12-06 | End: 2022-12-06 | Stop reason: HOSPADM

## 2022-12-06 RX ORDER — INDOCYANINE GREEN AND WATER 25 MG
KIT INJECTION AS NEEDED
Status: DISCONTINUED | OUTPATIENT
Start: 2022-12-06 | End: 2022-12-06 | Stop reason: HOSPADM

## 2022-12-06 RX ORDER — FENTANYL CITRATE 50 UG/ML
INJECTION, SOLUTION INTRAMUSCULAR; INTRAVENOUS AS NEEDED
Status: DISCONTINUED | OUTPATIENT
Start: 2022-12-06 | End: 2022-12-06

## 2022-12-06 RX ORDER — DOCUSATE SODIUM 100 MG/1
100 CAPSULE, LIQUID FILLED ORAL 2 TIMES DAILY
Status: DISCONTINUED | OUTPATIENT
Start: 2022-12-06 | End: 2022-12-06 | Stop reason: HOSPADM

## 2022-12-06 RX ORDER — ACETAMINOPHEN 325 MG/1
975 TABLET ORAL EVERY 6 HOURS PRN
Status: DISCONTINUED | OUTPATIENT
Start: 2022-12-06 | End: 2022-12-06 | Stop reason: HOSPADM

## 2022-12-06 RX ORDER — KETAMINE HCL IN NACL, ISO-OSM 100MG/10ML
SYRINGE (ML) INJECTION AS NEEDED
Status: DISCONTINUED | OUTPATIENT
Start: 2022-12-06 | End: 2022-12-06

## 2022-12-06 RX ORDER — SODIUM CHLORIDE 9 MG/ML
INJECTION, SOLUTION INTRAVENOUS CONTINUOUS PRN
Status: DISCONTINUED | OUTPATIENT
Start: 2022-12-06 | End: 2022-12-06

## 2022-12-06 RX ORDER — NEOSTIGMINE METHYLSULFATE 1 MG/ML
INJECTION INTRAVENOUS AS NEEDED
Status: DISCONTINUED | OUTPATIENT
Start: 2022-12-06 | End: 2022-12-06

## 2022-12-06 RX ORDER — ONDANSETRON 2 MG/ML
INJECTION INTRAMUSCULAR; INTRAVENOUS AS NEEDED
Status: DISCONTINUED | OUTPATIENT
Start: 2022-12-06 | End: 2022-12-06

## 2022-12-06 RX ORDER — PROPOFOL 10 MG/ML
INJECTION, EMULSION INTRAVENOUS AS NEEDED
Status: DISCONTINUED | OUTPATIENT
Start: 2022-12-06 | End: 2022-12-06

## 2022-12-06 RX ORDER — DEXAMETHASONE SODIUM PHOSPHATE 10 MG/ML
INJECTION, SOLUTION INTRAMUSCULAR; INTRAVENOUS AS NEEDED
Status: DISCONTINUED | OUTPATIENT
Start: 2022-12-06 | End: 2022-12-06

## 2022-12-06 RX ORDER — HEPARIN SODIUM 5000 [USP'U]/ML
5000 INJECTION, SOLUTION INTRAVENOUS; SUBCUTANEOUS
Status: COMPLETED | OUTPATIENT
Start: 2022-12-06 | End: 2022-12-06

## 2022-12-06 RX ORDER — ONDANSETRON 2 MG/ML
4 INJECTION INTRAMUSCULAR; INTRAVENOUS EVERY 6 HOURS PRN
Status: DISCONTINUED | OUTPATIENT
Start: 2022-12-06 | End: 2022-12-06 | Stop reason: HOSPADM

## 2022-12-06 RX ORDER — LIDOCAINE HYDROCHLORIDE 20 MG/ML
INJECTION, SOLUTION EPIDURAL; INFILTRATION; INTRACAUDAL; PERINEURAL AS NEEDED
Status: DISCONTINUED | OUTPATIENT
Start: 2022-12-06 | End: 2022-12-06

## 2022-12-06 RX ADMIN — OXYCODONE HYDROCHLORIDE 10 MG: 5 TABLET ORAL at 12:49

## 2022-12-06 RX ADMIN — LIDOCAINE HYDROCHLORIDE 100 MG: 20 INJECTION, SOLUTION EPIDURAL; INFILTRATION; INTRACAUDAL; PERINEURAL at 07:38

## 2022-12-06 RX ADMIN — Medication 25 MCG: at 10:37

## 2022-12-06 RX ADMIN — GABAPENTIN 100 MG: 100 CAPSULE ORAL at 07:04

## 2022-12-06 RX ADMIN — PROPOFOL 50 MG: 10 INJECTION, EMULSION INTRAVENOUS at 07:40

## 2022-12-06 RX ADMIN — CEFAZOLIN SODIUM 2000 MG: 1 SOLUTION INTRAVENOUS at 07:50

## 2022-12-06 RX ADMIN — Medication 10 MG: at 09:26

## 2022-12-06 RX ADMIN — ACETAMINOPHEN 975 MG: 325 TABLET ORAL at 07:04

## 2022-12-06 RX ADMIN — SODIUM CHLORIDE: 0.9 INJECTION, SOLUTION INTRAVENOUS at 07:43

## 2022-12-06 RX ADMIN — PROPOFOL 50 MG: 10 INJECTION, EMULSION INTRAVENOUS at 10:07

## 2022-12-06 RX ADMIN — Medication 10 MG: at 08:48

## 2022-12-06 RX ADMIN — HYDROMORPHONE HYDROCHLORIDE 0.5 MG: 2 INJECTION, SOLUTION INTRAMUSCULAR; INTRAVENOUS; SUBCUTANEOUS at 10:15

## 2022-12-06 RX ADMIN — HYDROMORPHONE HYDROCHLORIDE 0.5 MG: 2 INJECTION, SOLUTION INTRAMUSCULAR; INTRAVENOUS; SUBCUTANEOUS at 09:26

## 2022-12-06 RX ADMIN — FENTANYL CITRATE 100 MCG: 50 INJECTION INTRAMUSCULAR; INTRAVENOUS at 08:07

## 2022-12-06 RX ADMIN — SODIUM CHLORIDE, SODIUM LACTATE, POTASSIUM CHLORIDE, AND CALCIUM CHLORIDE: .6; .31; .03; .02 INJECTION, SOLUTION INTRAVENOUS at 07:15

## 2022-12-06 RX ADMIN — Medication 5 MG: at 09:37

## 2022-12-06 RX ADMIN — HYDROMORPHONE HYDROCHLORIDE 0.5 MG: 2 INJECTION, SOLUTION INTRAMUSCULAR; INTRAVENOUS; SUBCUTANEOUS at 09:42

## 2022-12-06 RX ADMIN — FENTANYL CITRATE 100 MCG: 50 INJECTION INTRAMUSCULAR; INTRAVENOUS at 07:38

## 2022-12-06 RX ADMIN — MIDAZOLAM 2 MG: 1 INJECTION INTRAMUSCULAR; INTRAVENOUS at 07:28

## 2022-12-06 RX ADMIN — HYDROMORPHONE HYDROCHLORIDE 0.5 MG: 2 INJECTION, SOLUTION INTRAMUSCULAR; INTRAVENOUS; SUBCUTANEOUS at 10:09

## 2022-12-06 RX ADMIN — METOCLOPRAMIDE HYDROCHLORIDE 10 MG: 5 INJECTION INTRAMUSCULAR; INTRAVENOUS at 11:29

## 2022-12-06 RX ADMIN — DEXAMETHASONE SODIUM PHOSPHATE 10 MG: 10 INJECTION INTRAMUSCULAR; INTRAVENOUS at 07:41

## 2022-12-06 RX ADMIN — MIDAZOLAM 2 MG: 1 INJECTION INTRAMUSCULAR; INTRAVENOUS at 07:34

## 2022-12-06 RX ADMIN — Medication 25 MCG: at 10:48

## 2022-12-06 RX ADMIN — ROCURONIUM BROMIDE 60 MG: 50 INJECTION INTRAVENOUS at 07:40

## 2022-12-06 RX ADMIN — PROPOFOL 150 MG: 10 INJECTION, EMULSION INTRAVENOUS at 07:39

## 2022-12-06 RX ADMIN — ROCURONIUM BROMIDE 20 MG: 50 INJECTION INTRAVENOUS at 09:20

## 2022-12-06 RX ADMIN — NEOSTIGMINE METHYLSULFATE 4 MG: 1 INJECTION INTRAVENOUS at 10:06

## 2022-12-06 RX ADMIN — Medication 25 MG: at 08:13

## 2022-12-06 RX ADMIN — ROCURONIUM BROMIDE 20 MG: 50 INJECTION INTRAVENOUS at 08:45

## 2022-12-06 RX ADMIN — GLYCOPYRROLATE 0.4 MCG: 0.2 INJECTION, SOLUTION INTRAMUSCULAR; INTRAVENOUS at 10:06

## 2022-12-06 RX ADMIN — SODIUM CHLORIDE, SODIUM LACTATE, POTASSIUM CHLORIDE, AND CALCIUM CHLORIDE: .6; .31; .03; .02 INJECTION, SOLUTION INTRAVENOUS at 08:50

## 2022-12-06 RX ADMIN — ONDANSETRON 4 MG: 2 INJECTION INTRAMUSCULAR; INTRAVENOUS at 09:51

## 2022-12-06 RX ADMIN — HEPARIN SODIUM 5000 UNITS: 5000 INJECTION INTRAVENOUS; SUBCUTANEOUS at 07:04

## 2022-12-06 NOTE — ANESTHESIA POSTPROCEDURE EVALUATION
Post-Op Assessment Note    CV Status:  Stable  Pain Score: 0    Pain management: adequate     Mental Status:  Sleepy   Hydration Status:  Euvolemic   PONV Controlled:  None   Airway Patency:  Patent      Post Op Vitals Reviewed: Yes      Staff: CRNA         No notable events documented      /67 (12/06/22 1020)    Temp 97 7 °F (36 5 °C) (12/06/22 1020)    Pulse 83 (12/06/22 1020)   Resp 14 (12/06/22 1020)    SpO2  100

## 2022-12-06 NOTE — INTERVAL H&P NOTE
H&P reviewed  After examining the patient I find no changes in the patients condition since the H&P had been written      Vitals:    12/06/22 0615   BP: 122/73   Pulse: 92   Temp: (!) 97 3 °F (36 3 °C)   SpO2: 98%

## 2022-12-08 ENCOUNTER — DOCUMENTATION (OUTPATIENT)
Dept: GENETICS | Facility: CLINIC | Age: 37
End: 2022-12-08

## 2022-12-12 ENCOUNTER — TELEPHONE (OUTPATIENT)
Dept: GYNECOLOGIC ONCOLOGY | Facility: CLINIC | Age: 37
End: 2022-12-12

## 2022-12-12 NOTE — TELEPHONE ENCOUNTER
Call placed to patient  Results reviewed  ----- Message from Andrea Shelby sent at 12/12/2022  3:25 PM EST -----  Regarding: FW: Question regarding test results   Contact: [phone]    ----- Message -----  From: Shayla Perez  Sent: 12/12/2022   3:15 PM EST  To: Galdino Epperson [facility]  Subject: Question regarding test results                  Hi I didn’t understand what the test results mean can someone contact me?

## 2022-12-27 ENCOUNTER — TELEPHONE (OUTPATIENT)
Dept: GYNECOLOGIC ONCOLOGY | Facility: CLINIC | Age: 37
End: 2022-12-27

## 2022-12-27 DIAGNOSIS — R30.0 DYSURIA: Primary | ICD-10-CM

## 2023-01-11 ENCOUNTER — TELEPHONE (OUTPATIENT)
Dept: GYNECOLOGIC ONCOLOGY | Facility: CLINIC | Age: 38
End: 2023-01-11

## 2023-01-12 ENCOUNTER — TELEPHONE (OUTPATIENT)
Dept: SURGICAL ONCOLOGY | Facility: CLINIC | Age: 38
End: 2023-01-12

## 2023-01-17 ENCOUNTER — VBI (OUTPATIENT)
Dept: ADMINISTRATIVE | Facility: OTHER | Age: 38
End: 2023-01-17

## 2023-01-17 ENCOUNTER — TELEPHONE (OUTPATIENT)
Dept: GYNECOLOGIC ONCOLOGY | Facility: CLINIC | Age: 38
End: 2023-01-17

## 2023-01-17 NOTE — TELEPHONE ENCOUNTER
Patient called into the office to reschedule her appointment with Dr Kalie Pastrana for Farooq   Patient is scheduled for Jan 20th @ 4pm

## 2023-01-23 ENCOUNTER — TELEPHONE (OUTPATIENT)
Dept: GYNECOLOGIC ONCOLOGY | Facility: CLINIC | Age: 38
End: 2023-01-23

## 2023-01-23 NOTE — TELEPHONE ENCOUNTER
Patient called into the office to reschedule her post op appointment with Dr Jen Ogden due to transportation issues  Spoke with TG/ NPT regarding the appointment  Patient is okay to wait til the 13th of February   Patient is scheduled for 2 13 @ [address]

## 2023-02-13 ENCOUNTER — OFFICE VISIT (OUTPATIENT)
Dept: GYNECOLOGIC ONCOLOGY | Facility: CLINIC | Age: 38
End: 2023-02-13

## 2023-02-13 VITALS
DIASTOLIC BLOOD PRESSURE: 80 MMHG | OXYGEN SATURATION: 99 % | SYSTOLIC BLOOD PRESSURE: 140 MMHG | HEIGHT: 69 IN | TEMPERATURE: 96.9 F | HEART RATE: 78 BPM | BODY MASS INDEX: 28.14 KG/M2 | WEIGHT: 190 LBS

## 2023-02-13 DIAGNOSIS — N85.02 COMPLEX ATYPICAL ENDOMETRIAL HYPERPLASIA: Primary | ICD-10-CM

## 2023-02-13 DIAGNOSIS — R30.0 DYSURIA: ICD-10-CM

## 2023-02-13 RX ORDER — CEPHALEXIN 500 MG/1
500 CAPSULE ORAL EVERY 8 HOURS SCHEDULED
Qty: 15 CAPSULE | Refills: 0 | Status: SHIPPED | OUTPATIENT
Start: 2023-02-13 | End: 2023-02-13 | Stop reason: SDUPTHER

## 2023-02-13 RX ORDER — CEPHALEXIN 500 MG/1
500 CAPSULE ORAL EVERY 8 HOURS SCHEDULED
Qty: 15 CAPSULE | Refills: 0 | Status: SHIPPED | OUTPATIENT
Start: 2023-02-13 | End: 2023-02-18

## 2023-02-13 NOTE — ASSESSMENT & PLAN NOTE
· Normal post operative exam  · Pathology results discussed, no indication for further oncologic follow-up or treatment  · Sexual activity precautions discussed  · She may return to general OBGYN care w/ Dr Gayle Garcia for her well woman care

## 2023-02-13 NOTE — ASSESSMENT & PLAN NOTE
· Keflex sent to pharmacy  · Lab slip for UA w/ culture sent to [facility] lab

## 2023-02-13 NOTE — PROGRESS NOTES
OB/GYN VISIT  Thelma Malhotra  2023  10:33 AM    Subjective:     Thelma Malhotra is a 30 y o  [id number] female who presents for post op visit from [facility] w/ SLND, BS, cytoscopy on 22 for biopsy-proven complex atypical hyperplasia bordering on grade 1 endometrial cancer  She has been recovering well, tolerating regular diet, and has appropriate bowel & bladder function  She has not yet returned to sexual activity, but desires to do so  She feels as if she may have a UTI due to some burning with urination  Pathology results are as below:    A  Uterus, with cervix, bilateral fallopian tubes:  - Atypical endometrial hyperplasia/endometrioid intraepithelial neoplasia  - Cervix is unremarkable  - Bilateral fallopian tubes are unremarkable       B  Lymph Node, [place], right pelvic:  - One lymph node, negative for carcinoma (0/1)  - No tumor identified with pan keratin immunoperoxidase stain       C  Lymph Node, [place], left pelvic:  - One lymph node, negative for carcinoma (0/1)  - No tumor identified with pan keratin immunoperoxidase stain  Past Surgical History:   Procedure Laterality Date   •  SECTION  2018   • CYSTOSCOPY N/A 2022    Procedure: CYSTOSCOPY;  Surgeon: Mary Armando MD;  [facility];  Service: Gynecology Oncology   • POLYPECTOMY N/A 10/20/2022    Procedure: POLYPECTOMY;  Surgeon: Stevo Bray MD;  [facility];  Service: Gynecology   • IL HYSTEROSCOPY BX ENDOMETRIUM&/POLYPC W/WO D&C N/A 10/20/2022    Procedure: D&C W/HYSTEROSCOPY;  Surgeon: Stevo Bray MD;  [facility];  Service: Gynecology   • IL INJECTION PROCEDURE LYMPHANGIOGRAPHY N/A 2022    Procedure: LYMPHOGRAPHY WITH INDOCYANINE GREEN (ICG);   Surgeon: Mary Armando MD;  [facility];  Service: Gynecology Oncology   • IL LAPS TOTAL HYSTERECT 250 GM/< W/RMVL TUBE/OVARY N/A 2022    Procedure: HYSTERECTOMY LAPAROSCOPIC TOTAL ([address]) W/ ROBOTICS, BILATERAL SALPINGECTOMY, Bilateral PELVI SENTINAL  LYMPH NODE DISSECTION;  Surgeon: Kamryn Huff MD;  [facility];  Service: Gynecology Oncology       Objective:    Vitals: Blood pressure 140/80, pulse 78, temperature (!) 96 9 °F (36 1 °C), temperature source Temporal, height 5' 9" (1 753 m), weight 86 2 kg (190 lb), last menstrual period 10/05/2022, SpO2 99 %  Body mass index is 28 06 kg/m²  Physical Exam  Constitutional:       Appearance: Normal appearance  HENT:      Head: Normocephalic  Mouth/Throat:      Mouth: Mucous membranes are moist    Abdominal:      General: Abdomen is flat  Palpations: Abdomen is soft  Tenderness: There is no abdominal tenderness  Comments: Robotic port sites are clean, dry, and well healed  Genitourinary:     Comments: Normal vulva, vagina  Well healed vaginal cuff on speculum exam with no suture material seen  Intact vaginal cuff on gentle digital exam with stratafix palpated  Musculoskeletal:         General: Normal range of motion  Skin:     General: Skin is warm  Neurological:      Mental Status: She is alert and oriented to person, place, and time     Psychiatric:         Mood and Affect: Mood normal          Behavior: Behavior normal       Comments: Nervous           Assessment/Plan:  Problem List Items Addressed This Visit        Genitourinary    Complex atypical endometrial hyperplasia - Primary     · Normal post operative exam  · Pathology results discussed, no indication for further oncologic follow-up or treatment  · Sexual activity precautions discussed  · She may return to general OBGYN care w/ Dr Viviana Boston for her well woman care            Other    Dysuria     · Keflex sent to pharmacy  · Lab slip for UA w/ culture sent to [facility] lab         Relevant Orders    UA w Reflex to Microscopic w Reflex to Culture       Seen and examined w/ Dr Dane Xie, DO  2/13/2023  10:33 AM    Please note, all notes within the [facility] are written in medical terminology for other doctors to read  If you have a question about something you see in your chart, please don't hesitate to ask about it at your next appointment  All test results are immediately available through [employer] as well, and I will review results at my earliest opportunity and contact you with the appropriate urgency

## 2023-02-14 DIAGNOSIS — R30.0 DYSURIA: Primary | ICD-10-CM

## 2023-03-09 ENCOUNTER — TELEPHONE (OUTPATIENT)
Dept: INTERNAL MEDICINE CLINIC | Facility: CLINIC | Age: 38
End: 2023-03-09

## 2023-03-09 NOTE — TELEPHONE ENCOUNTER
Patient has an appointment scheduled for 3/10/23 with charlee richard, for itchiness she has been experiencing on her lower stomach everytime she sweats  She states this has been happening after her hysterectomy  She would like to know if theres any OTC medication she can use until her appointment, to stop the itchiness

## 2023-03-10 ENCOUNTER — OFFICE VISIT (OUTPATIENT)
Dept: INTERNAL MEDICINE CLINIC | Facility: CLINIC | Age: 38
End: 2023-03-10

## 2023-03-10 VITALS
HEART RATE: 91 BPM | SYSTOLIC BLOOD PRESSURE: 112 MMHG | TEMPERATURE: 98.6 F | DIASTOLIC BLOOD PRESSURE: 68 MMHG | OXYGEN SATURATION: 98 % | HEIGHT: 69 IN | WEIGHT: 195.2 LBS | BODY MASS INDEX: 28.91 KG/M2

## 2023-03-10 DIAGNOSIS — R21 PAPULAR RASH: Primary | ICD-10-CM

## 2023-03-10 RX ORDER — TRIAMCINOLONE ACETONIDE 1 MG/G
CREAM TOPICAL 2 TIMES DAILY
Qty: 15 G | Refills: 0 | Status: SHIPPED | OUTPATIENT
Start: 2023-03-10 | End: 2023-03-17

## 2023-03-10 NOTE — PROGRESS NOTES
[facility] [place]  Standard Office Visit  Patient ID: Justin Hernandez    : 1985  Age/Gender: 41 y o  female     DATE: 3/10/2023      Assessment/Plan:    Papular rash  Topical triamcinolone 1% cream to the affected area 2x day for 7 days  Wash with dove bar soap  Increase hydration  Dove moisturizer  Switch detergent to hypoallergenic brand  Avoid coconut oil  Stop OTC supplements  Take luke warm showers, short duration, every other day  Will check labs  Will hold on FSH/LH as this time as ovaries were left during surgery  Will consider based on labs and response to therapy  Start OTC antihistamine (claritin) 10 mg daily  Schedule regular PCP follow up  Diagnoses and all orders for this visit:    Papular rash  -     TSH, 3rd generation; Future  -     Comprehensive metabolic panel; Future  -     CBC and differential; Future  -     triamcinolone (KENALOG) 0 1 % cream; Apply topically 2 (two) times a day for 7 days                Subjective:   Chief Complaint   Patient presents with   • Follow-up     After sweating or stressed out gets rash on stomach and armpits for 1 week          Justin Hernandez is a 41 y o  female who presents to the office on 3/10/2023 for     40 yo female for eval of itching rash on her trunk present for several days  Recent hysterectomy 2022  She did not have her ovaries removed  She notes she has been heeling well but notes  She notes she follows with GYN (Dr Jen Ogden)  She notes her yearly apt is in several months  She was told as her GYN apt her internal exam is normal   She notes that she feels she has a rash on her trunk that comes and goes  Worse if she is sweating or feeling warm  Notes rash is confined to abdomen and under arms  She uses dove or olay soaps  Using coconut oil for moisturized  She uses gain detergent, no recent change, no family with similar rash  LMP prior to surgery  Has not had bleeding  She notes she still gets typical monthly symptoms as she normally would have all except for the bleeding  No supplements or herbals at this time  She notes she was using [employer] supplement  She has stopped roughly 1 month ago  Localized to abdomen and under arms  No contacts with similar sx ( and young children not effected)  No insect bites  She does take hot, long showers  Rash  This is a new problem  The current episode started in the past 7 days  The affected locations include the abdomen  The problem is mild  The rash is characterized by dryness and itchiness  She was exposed to nothing  Pertinent negatives include no anorexia, cough, decreased physical activity, diarrhea, fever, rhinorrhea, shortness of breath, sore throat or vomiting  Past treatments include nothing  The treatment provided no relief  Her past medical history is significant for allergies (latex, no seasonal allergies)  The following portions of the patient's history were reviewed and updated as appropriate: allergies, current medications, past family history, past medical history, past social history, past surgical history and problem list     Review of Systems   Constitutional: Positive for chills and unexpected weight change (weight gain)  Negative for fever  HENT: Negative for rhinorrhea and sore throat  Respiratory: Negative for cough, shortness of breath and wheezing  Cardiovascular: Negative for chest pain and palpitations  Gastrointestinal: Negative for anorexia, diarrhea, nausea and vomiting  Skin: Positive for rash           Patient Active Problem List   Diagnosis   • Cyst of left breast   • Depression with anxiety   • Cystic acne   • Iron deficiency anemia due to chronic blood loss   • Anemia   • HSV-2 seropositive   • PTSD (post-traumatic stress disorder)   • Upper respiratory tract infection   • Complex atypical endometrial hyperplasia   • Family history of cancer   • S/P robot-assisted surgical procedure   • Dysuria   • Papular rash       Past Medical History:   Diagnosis Date   • Abdominal pain    • Abnormal Pap smear of cervix     2018-+HPV;   • Allergic    • Anemia    • Anxiety     and preop anxiety(provided emotional support) per pt--also has 3 yr old autistic son--his" Dad does help pt when needed-very supportive"   • Asthma    • Chest pain     per pt"has gone to ED and had heart checked" reports PCP feels related to anxiety"   • Chlamydia infection     treated in    • Depression     per pt "stopped taking her meds"   • Exercise involving stretching     also yoga   • Family history of breast cancer     "Mom and also has Ovarian Cancer"   • Heavy menses     "and clots"   • History of transfusion    • Indigestion    • Low back pain    • Medical marijuana use    • Migraine    • Migraine headache    • Motion sickness    • PONV (postoperative nausea and vomiting)    • PTSD (post-traumatic stress disorder)        Past Surgical History:   Procedure Laterality Date   •  SECTION  2018   • CYSTOSCOPY N/A 2022    Procedure: Pamela Joshi;  Surgeon: Whitney Clemens MD;  [facility];  Service: Gynecology Oncology   • POLYPECTOMY N/A 10/20/2022    Procedure: POLYPECTOMY;  Surgeon: Aicha Arrington MD;  [facility];  Service: Gynecology   • FL HYSTEROSCOPY BX ENDOMETRIUM&/POLYPC W/WO D&C N/A 10/20/2022    Procedure: D&C W/HYSTEROSCOPY;  Surgeon: Aicha Arrington MD;  [facility];  Service: Gynecology   • FL INJECTION PROCEDURE LYMPHANGIOGRAPHY N/A 2022    Procedure: LYMPHOGRAPHY WITH INDOCYANINE GREEN (ICG);   Surgeon: Whitney Clemens MD;  [facility];  Service: Gynecology Oncology   • FL LAPS TOTAL HYSTERECT 250 GM/< W/RMVL TUBE/OVARY N/A 2022    Procedure: HYSTERECTOMY LAPAROSCOPIC TOTAL ([address]) W/ ROBOTICS, BILATERAL SALPINGECTOMY, Bilateral PELVI SENTINAL  LYMPH NODE DISSECTION;  Surgeon: Luis Antonio Vargas Trina Landa MD;  [facility];  Service: Gynecology Oncology         Current Outpatient Medications:   •  triamcinolone (KENALOG) 0 1 % cream, Apply topically 2 (two) times a day for 7 days, Disp: 15 g, Rfl: 0    Allergies   Allergen Reactions   • Latex Hives and Throat Swelling       Social History     Socioeconomic History   • Marital status: /Civil Union     Spouse name: Xavi   • Number of children: 1   • Years of education: None   • Highest education level: High school graduate   Occupational History   • Occupation:    Tobacco Use   • Smoking status: Former     Packs/day: 0 25     Years: 1 00     Pack years: 0 25     Types: Cigarettes     Start date: 2017     Quit date: 2018     Years since quittin 1   • Smokeless tobacco: Never   • Tobacco comments:     Nicotine Vaping and Medical Marijuana Vaping --to cut back -and not to [employer]  Vaping Use   • Vaping Use: Every day   • Substances: Nicotine, THC (oil), CBD   Substance and Sexual Activity   • Alcohol use:  Yes     Alcohol/week: 0 0 - 1 0 standard drinks     Comment: 1 glass of wine monthly   • Drug use: Yes     Types: Marijuana     Comment: medical marijuana-Vaping, still uses   • Sexual activity: Not Currently     Partners: Male     Birth control/protection: None     Comment: lifetime partners: 5;     Other Topics Concern   • None   Social History Narrative    Restoration: [employer] blood products     Social Determinants of Health     Financial Resource Strain: Not on file   Food Insecurity: Not on file   Transportation Needs: Not on file   Physical Activity: Not on file   Stress: Not on file   Social Connections: Not on file   Intimate Partner Violence: Not on file   Housing Stability: Not on file       Family History   Problem Relation Age of Onset   • Ovarian cancer Mother 36   • Breast cancer Mother 44   • BRCA 1/2 Mother         positive, unsure if BRCA 1 or 2   • Cirrhosis Father    • Hepatitis Father         unsure of which type   • Ovarian cancer Sister 16        BRCA negative   • Leukemia Sister 8   • Lung cancer Sister 12   • Fibromyalgia Sister    • Asthma Sister    • Diabetes type II Brother    • Diabetes type II Brother    • No Known Problems Brother    • Colon cancer Brother          age 47   • Multiple sclerosis Brother    • No Known Problems Maternal Grandmother    • No Known Problems Maternal Grandfather    • No Known Problems Paternal Grandmother    • No Known Problems Paternal Grandfather    • No Known Problems Paternal Aunt    • No Known Problems Paternal Aunt    • No Known Problems Paternal Aunt    • No Known Problems Paternal Aunt    • No Known Problems Paternal Aunt    • No Known Problems Paternal Aunt    • No Known Problems Paternal Aunt        PHQ-2/9 Depression Screening           Health Maintenance   Topic Date Due   • COVID-19 Vaccine (1) Never done   • BMI: Followup Plan  Never done   • Annual Physical  Never done   • Influenza Vaccine (1) 2022   • Cervical Cancer Screening  2022   • BMI: Adult  03/10/2024   • DTaP,Tdap,and Td Vaccines (2 - Td or Tdap) 2028   • HIV Screening  Completed   • Hepatitis C Screening  Completed   • HPV Vaccine  Completed   • Pneumococcal Vaccine: Pediatrics (0 to 5 Years) and At-Risk Patients (6 to 59 Years)  Aged Out   • HIB Vaccine  Aged Out   • IPV Vaccine  Aged Out   • Hepatitis A Vaccine  Aged Out   • Meningococcal ACWY Vaccine  Aged [employer] History   Administered Date(s) Administered   • HPV Quadrivalent 2010, 2010, 2010   • Hep B, adult 2018, 03/15/2018   • INFLUENZA 12/15/2011, 10/18/2012   • Td (adult), adsorbed 2012   • Tdap 2018   • Tuberculin Skin Test-PPD Intradermal 2019        Objective:  Vitals:    03/10/23 1049   BP: 112/68   BP Location: Left arm   Patient Position: Sitting   Cuff Size: Standard   Pulse: 91   Temp: 98 6 °F (37 °C)   TempSrc: Temporal SpO2: 98%   Weight: 88 5 kg (195 lb 3 2 oz)   Height: 5' 9 49" (1 765 m)     Wt Readings from Last 3 Encounters:   03/10/23 88 5 kg (195 lb 3 2 oz)   02/13/23 86 2 kg (190 lb)   12/06/22 83 5 kg (184 lb)     Body mass index is 28 42 kg/m²  No results found  Physical Exam  Vitals and nursing note reviewed  Constitutional:       General: She is not in acute distress  Appearance: Normal appearance  She is well-developed  She is not ill-appearing, toxic-appearing or diaphoretic  Comments:   Alert, seated in room in NAD      Child present   HENT:      Head: Normocephalic and atraumatic  Mouth/Throat:      Mouth: Mucous membranes are moist       Pharynx: No oropharyngeal exudate or posterior oropharyngeal erythema  Eyes:      General: No scleral icterus  Right eye: No discharge  Left eye: No discharge  Pupils: Pupils are equal, round, and reactive to light  Cardiovascular:      Rate and Rhythm: Normal rate and regular rhythm  Heart sounds: Normal heart sounds  No murmur heard  Pulmonary:      Effort: Pulmonary effort is normal  No respiratory distress  Breath sounds: Normal breath sounds  No wheezing or rales  Abdominal:      General: Bowel sounds are normal  There is no distension  Palpations: Abdomen is soft  Tenderness: There is no abdominal tenderness  There is no guarding  Musculoskeletal:      Cervical back: Neck supple  Skin:     General: Skin is warm and dry  Neurological:      General: No focal deficit present  Mental Status: She is alert     Psychiatric:         Mood and Affect: Mood normal          Behavior: Behavior normal              Future Appointments   Date Time Provider Adrian Fuentes   3/31/2023  9:30 AM Stephen Thomas PA-C [facility]   5/16/2023  5:15 PM Sudheer Matthews MD [address] [place] [address], [place]  [facility] 32    Patient Care Team:  Dave Potter MD as PCP - [address] [place], [place] [address] [facility] ([facility])  Ronald Cortez MD as PCP - PCP-[employer] ([facility])    This note was completed in part utilizing M-Modal Fluency Direct Software  Grammatical errors, random word insertions, spelling mistakes, and incomplete sentences can be an occasional consequence of this system secondary to software limitations, ambient noise, and hardware issues  If you have any questions or concerns about the content, text, or information contained within the body of this dictation, please contact the provider for clarification

## 2023-03-10 NOTE — PATIENT INSTRUCTIONS
Papular rash  Topical triamcinolone 1% cream to the affected area  Wash with dove bar soap  Increase hydration  Dove moisturizer  Switch detergent to hypoallergenic brand  Avoid coconut oil  Stop OTC supplements  Take luke warm showers, short duration, every other day  Will check labs  Will hold on FSH/LH as this time as ovaries were left during surgery  Will consider based on labs and response to therapy  Start OTC antihistamine (claritin) 10 mg daily

## 2023-03-10 NOTE — ASSESSMENT & PLAN NOTE
Topical triamcinolone 1% cream to the affected area 2x day for 7 days  Wash with dove bar soap  Increase hydration  Dove moisturizer  Switch detergent to hypoallergenic brand  Avoid coconut oil  Stop OTC supplements  Take luke warm showers, short duration, every other day  Will check labs  Will hold on FSH/LH as this time as ovaries were left during surgery  Will consider based on labs and response to therapy  Start OTC antihistamine (claritin) 10 mg daily  Schedule regular PCP follow up

## 2023-03-21 NOTE — OP NOTE
OPERATIVE REPORT  PATIENT NAME: Sri Price    :  1985  MRN: [id number]  Pt Location: [facility] ROOM 14    SURGERY DATE: 2022    Surgeon(s) and Role:     * Dani Spence MD - Primary     * Fatuma Wilkes PA-C - Shailesh     * Valentín Scales PA-C - Assisting     * Michael Evans DO - Assisting    Preop Diagnosis:  Complex atypical endometrial hyperplasia [N85 02]    Post-Op Diagnosis Codes:     * Complex atypical endometrial hyperplasia [N85 02]    Procedure(s) (LRB):  HYSTERECTOMY LAPAROSCOPIC TOTAL ([address]) W/ ROBOTICS, BILATERAL SALPINGECTOMY, Bilateral PELVI SENTINAL  LYMPH NODE DISSECTION (N/A)  LYMPHOGRAPHY WITH INDOCYANINE GREEN (ICG) (N/A)  CYSTOSCOPY (N/A)    Specimen(s):  ID Type Source Tests Collected by Time Destination   1 :  Washing Pelvic Washing NON-GYNECOLOGIC CYTOLOGY Dani Spence MD 2022 [id number]    2 : with cervix,bilat  fallopian tubes Tissue Uterus TISSUE EXAM Dani Spence MD 2022 [id number]    3 : right pelvic Tissue Lymph Node, [place] TISSUE EXAM Dani Spence MD 2022 [id number]    4 : left pelvic Tissue Lymph Node, [place] TISSUE EXAM Dani Spence MD 2022 [id number]        Estimated Blood Loss:   Minimal    Drains:  [REMOVED] Urethral Catheter Non-latex 16 Fr  (Removed)   Number of days: 0       Anesthesia Type:   General    Operative Indications:  Complex atypical endometrial hyperplasia []  46-year-old para 1 who does not desire future fertility with biopsy-proven complex atypical hyperplasia bordering on endometrial cancer presents for definitive operative management  Operative Findings:  1  Exam under anesthesia revealed a gross normal cervix and the mobile uterus  There are no palpable  Masses  2   On laparoscopy, there is no evidence of peritoneal disease    The adnexa were grossly normal   3   Lymphography with ICG mapped to left external iliac lymph node and a right obturator lymph node   4   Cystoscopy with bilateral jets and no evidence of bladder injury    Complications:   None    Procedure and Technique:  After informed consent was obtained, the patient was taken the operating room where general endotracheal anesthesia was administered without incident  She was then prepped and draped in normal sterile fashion in the low dorsolithotomy position  Examination under anesthesia was then performed with findings noted as above  A speculum was placed in the patient's vagina  The anterior lip of the cervix was grasped with single-tooth tenaculum  The uterus was sounded to 8 cm  The cervix was dilated with Benesightcolbye Day dilators  ICG was then injected into the cervix at 3 and 9 o'clock-2 mL at each location  A 0 Vicryl stay suture was then placed on the cervix and used to secure the Ashwini uterine manipulator and koh cup  A Farnsworth catheter was then inserted  Tension was then turned to the abdomen  0 25% Marcaine was used to infiltrate the skin prior to placement of any trocar  The first insertion site was approximately 1 cm superior to the umbilicus in the midline  An 8 mm skin incision was made using an 11 blade scalpel  Through this was passed an 8 mm robotic trocar under direct visualization into the abdominal cavity  The abdomen was then insufflated to 15 mmHg using CO2 gas  Findings on laparoscopy are noted as above  Additional trochars then placed under direct visualization  There were 2 robotic trochars placed in the left side of the abdomen and one placed on the right side of the abdomen  A 5 mm assistant port was placed in the right upper quadrant  The robot was docked  Peritoneal cytology was obtained  Attention was first turned to the right pelvis  The retroperitoneal space was opened  The round ligament was cauterized and transected  The paravesical and pararectal spaces were developed    Using firefly technology, a right obturator lymph node was identified as the sentinel lymph node  The obturator nerve was identified  The lymph node was then removed superior to the  lymph nerve with excellent visualization of the nerve throughout the dissection  The lymph node was then placed in the right gutter  A window was made in broad ligament above the ureter  Attention was then turned to the left side  The retroperitoneal space was opened  The round ligament was cauterized and transected  The paravesical and pararectal spaces were developed  Using firefly technology, a left external iliac lymph node was mapped  This was then removed using monopolar cautery  Hemostasis was excellent  This lymph node was then placed in the left gutter  The bilateral fallopian tubes were then elevated  The mesosalpinx was cauterized and transected  The right fallopian tube was transected at the cornua and placed in the cul-de-sac  The bilateral utero-ovarian ligaments were then skeletonized, cauterized and transected with the vessel sealer  The vesicovaginal space was then developed  The bladder was able to be taken down below the level of the external os of the cervix  The uterine vessels were skeletonized bilaterally  They were cauterized and transected with the vessel sealer  The cardinal ligaments were then cauterized and transected  A circumferential colpotomy was then performed using monopolar cautery  The uterus, cervix, bilateral fallopian tubes were then removed transvaginally  The bilateral sentinel lymph nodes were also removed transvaginally  A 2 0 STRATAFIX suture was then advanced into the abdomen transvaginally  This was used to close the vaginal apex in a running fashion  Hemostasis was excellent  The pelvis was irrigated  The pressure in the pelvis was brought down to 5 mmHg without evidence of bleeding from any sites  The pressure was then increased  The robot was undocked  A 2-0 strata fix suture was then removed laparoscopically    The incisions were closed using 4-0 Monocryl followed by Exofin  The Farnsworth catheter was removed  The bladder was insufflated with 200 cc of normal saline using the suction irrigation device  The 5 mm laparoscope was then used as a cystoscope with findings noted as above  The Farnsworth catheter was then replaced to drain the bladder then removed  The vagina was inspected  There is no evidence of bleeding identified  The patient was then awakened and transferred to the recovery room in stable condition  All counts correct x2 from procedure  No complications  Estimate blood loss is less than 50 mL     I was present for the entire procedure and A physician assistant was required during the procedure for retraction tissue handling,dissection and suturing    Patient Disposition:  PACU         SIGNATURE: Jake Lozano MD  DATE: December 6, 2022  TIME: 11:10 AM [place]alt[place] Counseling: I discussed with the patient the risks of ixekizumab including but not limited to immunosuppression, serious infections, worsening of inflammatory bowel disease and drug reactions.  The patient understands that monitoring is required including a PPD at baseline and must alert us or the primary physician if symptoms of infection or other concerning signs are noted.

## 2023-05-09 DIAGNOSIS — R10.2 PELVIC PAIN: Primary | ICD-10-CM

## 2023-05-19 ENCOUNTER — OFFICE VISIT (OUTPATIENT)
Dept: OBGYN CLINIC | Facility: CLINIC | Age: 38
End: 2023-05-19

## 2023-05-19 VITALS
DIASTOLIC BLOOD PRESSURE: 80 MMHG | BODY MASS INDEX: 29.51 KG/M2 | SYSTOLIC BLOOD PRESSURE: 110 MMHG | HEIGHT: 69 IN | WEIGHT: 199.2 LBS

## 2023-05-19 DIAGNOSIS — N93.9 ABNORMAL VAGINAL BLEEDING: Primary | ICD-10-CM

## 2023-05-19 DIAGNOSIS — N94.10 DYSPAREUNIA IN FEMALE: ICD-10-CM

## 2023-05-19 DIAGNOSIS — R10.2 PELVIC PAIN: ICD-10-CM

## 2023-05-19 NOTE — PROGRESS NOTES
"Patient is a 41 y o  [id number] with Patient's last menstrual period was 10/05/2022 (approximate)  who presents requesting evaluation of cyclic vaginal bleeding s/p hysterectomy and pelvic pain/dyspareunia  She reports she underwent a robotic hysterectomy with Dr Yannick Calderon in 12/6/2022  She reports since that time she has had vaginal spotting monthly at the same time she would normally have a period in January, February, march and April  She typically gets the bleeding at the beginning of the month  She did not have any bleeding in May  She saw Dr Yannick Calderon in February and she reports he stated it may be from healing surgically  Pt denies any bleeding since early April  Advised patient that as her examination is normal today and she has not had any bleeding for 6-7 weeks to observe and call if it recurs  She is agreeable  She also reports that she has pelvic pain with intercourse or vaginal penetration and it is affecting her marriage  She is not sure if it is because she is anxious about injuring something after her hysterectomy or if there is a problem  We had discussed via mychart that she had similar problems prior to her hysterectomy and I had suggested pelvic floor physical therapy  She reports she has an appointment in June, but would like to be examined to make sure nothing else is going on  She reports the pain is greater on the left       Past Medical History:   Diagnosis Date   • Abdominal pain    • Abnormal Pap smear of cervix     2018-+HPV;   • Allergic    • Anemia    • Anxiety     and preop anxiety(provided emotional support) per pt--also has 3 yr old autistic son--his\" Dad does help pt when needed-very supportive\"   • Asthma    • Chest pain     per pt\"has gone to ED and had heart checked\" reports PCP feels related to anxiety\"   • Chlamydia infection     treated in 2015   • Depression     per pt \"stopped taking her meds\"   • Exercise involving stretching     also yoga   • Family history of breast cancer     " "\"Mom and also has Ovarian Cancer\"   • Heavy menses     \"and clots\"   • History of transfusion    • Indigestion    • Low back pain    • Medical marijuana use    • Migraine    • Migraine headache    • Motion sickness    • PONV (postoperative nausea and vomiting)    • PTSD (post-traumatic stress disorder)        Past Surgical History:   Procedure Laterality Date   •  SECTION  2018   • CYSTOSCOPY N/A 2022    Procedure: Sheela Gold;  Surgeon: Jennifer Olivas MD;  Location: [facility];  Service: Gynecology Oncology   • POLYPECTOMY N/A 10/20/2022    Procedure: POLYPECTOMY;  Surgeon: Barbara Nelson MD;  [facility];  Service: Gynecology   • NH HYSTEROSCOPY BX ENDOMETRIUM&/POLYPC W/WO D&C N/A 10/20/2022    Procedure: D&C W/HYSTEROSCOPY;  Surgeon: Barbara Nelson MD;  [facility];  Service: Gynecology   • NH INJECTION PROCEDURE LYMPHANGIOGRAPHY N/A 2022    Procedure: LYMPHOGRAPHY WITH INDOCYANINE GREEN (ICG);   Surgeon: Jennifer Olivas MD;  Location[facility];  Service: Gynecology Oncology   • NH LAPS TOTAL HYSTERECT 250 GM/< W/RMVL TUBE/OVARY N/A 2022    Procedure: HYSTERECTOMY LAPAROSCOPIC TOTAL ([address]) W/ ROBOTICS, BILATERAL SALPINGECTOMY, Bilateral PELVI SENTINAL  LYMPH NODE DISSECTION;  Surgeon: Jennifer Olivas MD;  [facility];  Service: Gynecology Oncology       OB History    Para Term  AB Living   5 1 0 1 4 1   SAB IAB Ectopic Multiple Live Births   4       1      # Outcome Date GA Lbr Ty/2nd Weight Sex Delivery Anes PTL Lv   5  18 35w4d  2830 g (6 lb 3 8 oz) M CS-LTranv EPI  SERGO   4 SAB 2017              Birth Comments: no surgery    3 SAB 2015              Birth Comments: no surgery    2 2013              Birth Comments: no surgery    1 2013              Birth Comments: no surgery       Obstetric Comments   Menarche: 10      Menses: 28/5-6/overnight pad every 2 hours x 4 days and then maxi pad " every 4 hours           Current Outpatient Medications:   •  triamcinolone (KENALOG) 0 1 % cream, Apply topically 2 (two) times a day for 7 days (Patient not taking: Reported on 2023), Disp: 15 g, Rfl: 0    Allergies   Allergen Reactions   • Latex Hives and Throat Swelling       Social History     Socioeconomic History   • Marital status: Single     Spouse name: Garo Yoon   • Number of children: 1   • Years of education: None   • Highest education level: High school graduate   Occupational History   • Occupation:    Tobacco Use   • Smoking status: Former     Packs/day: 0 25     Years: 1 00     Pack years: 0 25     Types: Cigarettes     Start date: 2017     Quit date: 2018     Years since quittin 3   • Smokeless tobacco: Never   • Tobacco comments:     Nicotine Vaping and Medical Marijuana Vaping --to cut back -and not to [employer]  Vaping Use   • Vaping Use: Every day   • Substances: Nicotine, THC (oil), CBD   Substance and Sexual Activity   • Alcohol use:  Yes     Alcohol/week: 0 0 - 1 0 standard drinks     Comment: 1 glass of wine monthly   • Drug use: Not Currently     Types: Marijuana     Comment: medical marijuana-Vaping, still uses   • Sexual activity: Not Currently     Partners: Male     Birth control/protection: None     Comment: lifetime partners: 5;     Other Topics Concern   • None   Social History Narrative    [employer]: [employer]     Social Determinants of Health     Financial Resource Strain: Not on file   Food Insecurity: Not on file   Transportation Needs: Not on file   Physical Activity: Not on file   Stress: Not on file   Social Connections: Not on file   Intimate Partner Violence: Not on file   Housing Stability: Not on file       Family History   Problem Relation Age of Onset   • Ovarian cancer Mother 36   • Breast cancer Mother 44   • BRCA 1/2 Mother         positive, unsure if BRCA 1 or 2   • Cirrhosis Father    • Hepatitis Father "   unsure of which type   • Ovarian cancer Sister 16        BRCA negative   • Leukemia Sister 8   • Lung cancer Sister 12   • Fibromyalgia Sister    • Asthma Sister    • Diabetes type II Brother    • Diabetes type II Brother    • No Known Problems Brother    • Colon cancer Brother          age 47   • Multiple sclerosis Brother    • No Known Problems Maternal Grandmother    • No Known Problems Maternal Grandfather    • No Known Problems Paternal Grandmother    • No Known Problems Paternal Grandfather    • No Known Problems Paternal Aunt    • No Known Problems Paternal Aunt    • No Known Problems Paternal Aunt    • No Known Problems Paternal Aunt    • No Known Problems Paternal Aunt    • No Known Problems Paternal Aunt    • No Known Problems Paternal Aunt        Review of Systems   Constitutional: Negative for chills, fatigue, fever and unexpected weight change  HENT: Negative for congestion, mouth sores and sore throat  Respiratory: Negative for cough, chest tightness, shortness of breath and wheezing  Cardiovascular: Negative for chest pain and palpitations  Gastrointestinal: Negative for abdominal distention, abdominal pain, constipation, diarrhea, nausea and vomiting  Endocrine: Negative for cold intolerance and heat intolerance  Genitourinary: Positive for dyspareunia, pelvic pain, vaginal bleeding and vaginal pain  Negative for dysuria, genital sores, menstrual problem and vaginal discharge  Musculoskeletal: Negative for arthralgias  Skin: Negative for color change and rash  Neurological: Negative for dizziness, light-headedness and headaches  Hematological: Negative for adenopathy  Blood pressure 110/80, height 5' 9 49\" (1 765 m), weight 90 4 kg (199 lb 3 2 oz), last menstrual period 10/05/2022  and Body mass index is 29 kg/m²  Physical Exam  Constitutional:       General: She is not in acute distress  Appearance: Normal appearance  She is not ill-appearing     HENT: " Head: Normocephalic and atraumatic  Eyes:      Extraocular Movements: Extraocular movements intact  Conjunctiva/sclera: Conjunctivae normal    Pulmonary:      Effort: Pulmonary effort is normal    Abdominal:      General: There is no distension  Palpations: Abdomen is soft  There is no mass  Tenderness: There is no abdominal tenderness  There is no guarding or rebound  Hernia: No hernia is present  Musculoskeletal:         General: Normal range of motion  Cervical back: Normal range of motion  Skin:     General: Skin is warm  Findings: No erythema or rash  Neurological:      Mental Status: She is alert and oriented to person, place, and time  Psychiatric:         Behavior: Behavior normal          Thought Content: Thought content normal          Judgment: Judgment normal       Comments: Anxious, tearful          vulva: normal external genitalia for age and no lesions, masses, epithelial changes, or exudate  vagina: color pink, rugae  well formed rugae and no bleeding noted, vaginal cuff well healed  Pt with minimal tenderness on right, but significant tenderness along left piriformis mulsces  cervix: surgically absent  uterus: surgically absent  adnexa: no masses or tenderness      A/P:  Pt is a 41 y o  [id number] with      Telma was seen today for vaginal bleeding  Diagnoses and all orders for this visit:    Abnormal vaginal bleeding  -resolved x 7 weeks  Pt will call if recurs    Dyspareunia in female  -has pelvic floor PT scheduled   Will see if this helps    Pelvic pain  As above

## 2023-06-12 ENCOUNTER — EVALUATION (OUTPATIENT)
Dept: PHYSICAL THERAPY | Facility: REHABILITATION | Age: 38
End: 2023-06-12
Payer: COMMERCIAL

## 2023-06-12 DIAGNOSIS — M62.89 PELVIC FLOOR DYSFUNCTION: Primary | ICD-10-CM

## 2023-06-12 DIAGNOSIS — R10.2 PELVIC PAIN: ICD-10-CM

## 2023-06-12 PROCEDURE — 97110 THERAPEUTIC EXERCISES: CPT | Performed by: PHYSICAL THERAPIST

## 2023-06-12 PROCEDURE — 97161 PT EVAL LOW COMPLEX 20 MIN: CPT | Performed by: PHYSICAL THERAPIST

## 2023-06-12 NOTE — PROGRESS NOTES
PT Evaluation     Today's date: 2023  Patient name: Antony Sheets  : 1985  MRN: [id number]  Referring provider: Geo Rodriguez MD  Dx:   Encounter Diagnosis     ICD-10-CM    1  Pelvic floor dysfunction  M62 89       2  Pelvic pain  R10 2 Ambulatory Referral to Physical Therapy          Start Time: 730  Stop Time: 830  Total time in clinic (min): 60 minutes    Assessment  Assessment details: Patient is a 41 y o  female who presents to physical therapy with diagnosis of pelvic pain  Internal assessment will be deferred until NV and performed with pt's consent  Pt will bring a support person likely for internal assessment  Functional impairments include pain with intercourse, pain with well woman exams, urinary urgency  Physical findings include L piriformis, post pelvic floor and OI tenderness  Remaining objective assessment will be performed in next 1-2 visits    Patient would benefit from formal physical therapy to address impairments as detailed, decrease pain, and restore maximal level of function for all home, work, and mobility tasks  Thank you for this referral     Impairments: abnormal muscle tone, lacks appropriate home exercise program and pain with function    Symptom irritability: lowUnderstanding of Dx/Px/POC: good   Prognosis: good    Goals  Short term goals (to be met by 4 weeks)    -Pt will present with pain rated 4/10 at worst  -Pt will present with (-) OI/post pelvic floor tenderness palpated externally on L    Long Term Goals (to be met by discharge    -Pt will be independent with HEP  -Pt will present with pain rated 2/10 at worst  -Pt will be able to tolerate speculum for well woman exam      Plan  Plan details: Cont per POC 1-2x/week  Objective assessment will be performed NV  Internal assessment will be performed NV     Patient would benefit from: skilled physical therapy  Planned modality interventions: biofeedback, cryotherapy and thermotherapy: hydrocollator packs  Planned therapy interventions: joint mobilization, manual therapy, patient education, strengthening, stretching, therapeutic activities, therapeutic exercise, abdominal trunk stabilization, balance, flexibility, functional ROM exercises and home exercise program  Frequency: 2x week  Duration in weeks: 12  Treatment plan discussed with: patient        PT Pelvic Floor Subjective:   History of Present Illness:   Pt reports she is traumatized and worried about this visit  In  she had a bad experience with an OBGYN  She had a negative experience and switched OBGYNS  In , noticing pain with intercourse all of last year  Went and saw OBGYN and also had pain with examination  She had a polypectomy and tested which showed cervical CA  In Dec 2022, she had a hysterectomy to treat her CA  Since the surgery, she was told she was healing fine  But she was still unable to have pain free intercourse with her partner  She was told she had weakness on one side of her vagina internally  Rec PFPT  Social Support:     Lives in:  [address] [place] [address] with:  Spouse and young children    Relationship status: /committed    Employment status: run a tattoo shop     History of Depression: yes  Diet and Exercise:    Diet:balanced nutrition    Caring for her 11year old   OB/ gyn History    Gestational History:     Prior Pregnancy: Yes      Number of prior pregnancies: 7+    Number of term pregnancies: 1    Delivery Type:  section      Delivery Complications:  3708    Menstrual History:    Hysterectomy, has ovaries  Bladder Function:     Voiding Difficulties positive for: urgency      Voiding Difficulties negative for: frequent urination, hesitancy, straining and incomplete emptying      Voiding Difficulties comments:     Voiding frequency: every 1-2 hours and every 3-4 hours    Urinary leakage: no urine leakage    Nocturia (episodes per night): 0 and 1    Painful urination: No      Intake (ounces):      Intake (ounces) "comment: Water: 48 oz   Iced Coffee: 2 cups  Fruit punch: 1 cup   Bowel Function:     Voiding DIfficulties: constipation      Bowel Function comments:  Intermittent constipation    Bowel frequency: multiple times a day and daily    Caswell Stool Scale: type 5 and type 4    Stool softener use: no stool softeners    Enema use: no enema    Uses \"squatty potty\": no Squatty Potty  Sexual Function:     Sexually Active:  Sexually active (non-penetrative sexual activities currently secondary to pain )    Pain during intercourse: Yes      Lubrication Use: No (i don't need it )      pain causes abstinence    Patient wishes to return to having intercourse: currently unable to have intercourse but wants to  Pain:     At worst pain ratin    Location:  Insertion during pelvic exam, L side vaginal canal   Treatments:     Current treatment: physical therapy    Patient Goals: Other patient goals: To have sex       Objective       Tenderness L post pelvic floor, OI  Cueing for 360 deg breathing with lateral rib expansion  Remaining objective assessment will be completed NV              Precautions: LATEX ALLERGY, past traumaticexperience  Impairments/functional limitations: pelvic pain, dyspareunia, L OI/post pelvic floor and piriformis tenderness  Daily Treatment Diary    Manuals          External assess nv         Internal assess nv         Objective assessment nv                   Neuro Re-Ed          TAC          PF          TAC+PF          TAC+PF with march          TAC+PF with alt knee fall out          TAC+PF with ball squeeze          TAC+PF with SLR          TAC+PF with s/l ABD          TAC+PF with bridge                              360 deg breathing review         Ther Ex          Supine piri stretch Review, 30\"x2L         LTR          Seated piri stretch          Supine butterfly stretch          Ofelia pose with diaphragmatic breath Review, 1 min         Happy baby                              Ther Activity  " Gait Training                              Modalities          sEMG biofeedback prn

## 2023-06-20 NOTE — PROGRESS NOTES
Pt NS for PT visit on 6/13/23  PT contacted pt regarding NS appointment on 2 separate occasions and left a message to have patient contact PT to confirm future appointments or her scheduled appointments would be removed from the schedule  Pt did not contact PT therefore appointments are cancelled  Pt d/c'ed from skilled PT  Can return as new patient if needed

## 2023-06-29 ENCOUNTER — APPOINTMENT (OUTPATIENT)
Dept: PHYSICAL THERAPY | Facility: REHABILITATION | Age: 38
End: 2023-06-29
Payer: COMMERCIAL

## 2023-07-17 ENCOUNTER — OFFICE VISIT (OUTPATIENT)
Dept: INTERNAL MEDICINE CLINIC | Facility: CLINIC | Age: 38
End: 2023-07-17
Payer: COMMERCIAL

## 2023-07-17 VITALS
WEIGHT: 199.4 LBS | HEART RATE: 82 BPM | OXYGEN SATURATION: 98 % | DIASTOLIC BLOOD PRESSURE: 80 MMHG | SYSTOLIC BLOOD PRESSURE: 102 MMHG | TEMPERATURE: 98.3 F | HEIGHT: 69 IN | BODY MASS INDEX: 29.53 KG/M2

## 2023-07-17 DIAGNOSIS — R42 DIZZINESS: Primary | ICD-10-CM

## 2023-07-17 PROCEDURE — 99213 OFFICE O/P EST LOW 20 MIN: CPT | Performed by: NURSE PRACTITIONER

## 2023-07-17 RX ORDER — PREDNISONE 20 MG/1
20 TABLET ORAL DAILY
Qty: 5 TABLET | Refills: 0 | Status: SHIPPED | OUTPATIENT
Start: 2023-07-17

## 2023-07-17 NOTE — ASSESSMENT & PLAN NOTE
Will get blood work  Advised to start antihistamine and Jai Zelaya short course of prednisone   Stay well hydrated, change position slowly   Consider referral for vestibular therapy

## 2023-07-17 NOTE — PROGRESS NOTES
Assessment/Plan:    Dizziness  Will get blood work  Advised to start antihistamine and Jai Schein short course of prednisone   Stay well hydrated, change position slowly   Consider referral for vestibular therapy                 Diagnoses and all orders for this visit:    Dizziness  -     CBC and differential  -     Comprehensive metabolic panel; Future  -     Iron Panel (Includes Ferritin, Iron Sat%, Iron, and TIBC); Future  -     predniSONE 20 mg tablet; Take 1 tablet (20 mg total) by mouth daily  -     TSH, 3rd generation with Free T4 reflex          Subjective:      Patient ID: Halle Hanson is a 41 y.o. female. Patient presents today for concerns for feeling unbalanced and dizzy for about one month. Feels dizzy with change of position and when seated, she reports that it makes her feel weak  She reports that she just wants to sleep because of the dizziness    Denies family history of sudden cardiac death     Full Hysteretomcy  in Dec 2022   Denies changes in diet  She reports that she has been staying hydrated    Dizziness  This is a new problem. The current episode started 1 to 4 weeks ago. The problem has been gradually improving. Associated symptoms include chills, headaches, nausea and weakness. Pertinent negatives include no abdominal pain, anorexia, arthralgias, chest pain, congestion, coughing, diaphoresis, fever, neck pain, numbness, rash, sore throat, urinary symptoms, vertigo or vomiting. The following portions of the patient's history were reviewed and updated as appropriate: allergies, current medications, past family history, past medical history, past social history, past surgical history and problem list.    Review of Systems   Constitutional: Positive for chills. Negative for activity change, appetite change, diaphoresis and fever. HENT: Negative for congestion, ear discharge, ear pain, postnasal drip, rhinorrhea, sinus pressure, sinus pain and sore throat.     Eyes: Negative for pain, discharge, itching and visual disturbance. Respiratory: Positive for chest tightness. Negative for cough, shortness of breath and wheezing. Cardiovascular: Negative for chest pain, palpitations and leg swelling. Gastrointestinal: Positive for nausea. Negative for abdominal pain, anorexia, blood in stool, constipation, diarrhea and vomiting. Endocrine: Negative for polydipsia, polyphagia and polyuria. Genitourinary: Negative for difficulty urinating, dysuria, hematuria and urgency. Musculoskeletal: Negative for arthralgias, back pain and neck pain. Skin: Negative for rash and wound. Allergic/Immunologic: Positive for environmental allergies. Neurological: Positive for dizziness, weakness and headaches. Negative for vertigo and numbness.          Past Medical History:   Diagnosis Date   • Abdominal pain    • Abnormal Pap smear of cervix     2018-+HPV;   • Allergic    • Anemia    • Anxiety     and preop anxiety(provided emotional support) per pt--also has 3 yr old autistic son--his" Dad does help pt when needed-very supportive"   • Asthma    • Chest pain     per pt"has gone to ED and had heart checked" reports PCP feels related to anxiety"   • Chlamydia infection     treated in 2015   • Depression     per pt "stopped taking her meds"   • Exercise involving stretching     also yoga   • Family history of breast cancer     "Mom and also has Ovarian Cancer"   • Heavy menses     "and clots"   • History of transfusion    • Indigestion    • Low back pain    • Medical marijuana use    • Migraine    • Migraine headache    • Motion sickness    • PONV (postoperative nausea and vomiting)    • PTSD (post-traumatic stress disorder)          Current Outpatient Medications:   •  predniSONE 20 mg tablet, Take 1 tablet (20 mg total) by mouth daily, Disp: 5 tablet, Rfl: 0  •  triamcinolone (KENALOG) 0.1 % cream, Apply topically 2 (two) times a day for 7 days (Patient not taking: Reported on 2023), Disp: 15 g, Rfl: 0    Allergies   Allergen Reactions   • Latex Hives and Throat Swelling       Social History   Past Surgical History:   Procedure Laterality Date   •  SECTION  2018   • CYSTOSCOPY N/A 2022    Procedure: CYSTOSCOPY;  Surgeon: Vanessa Mason MD;  [facility];  Service: Gynecology Oncology   • POLYPECTOMY N/A 10/20/2022    Procedure: POLYPECTOMY;  Surgeon: Stew Arreaga MD;  [facility];  Service: Gynecology   • ME HYSTEROSCOPY BX ENDOMETRIUM&/POLYPC W/WO D&C N/A 10/20/2022    Procedure: D&C W/HYSTEROSCOPY;  Surgeon: Stew Arreaga MD;  [facility];  Service: Gynecology   • ME INJECTION PROCEDURE LYMPHANGIOGRAPHY N/A 2022    Procedure: LYMPHOGRAPHY WITH INDOCYANINE GREEN (ICG);   Surgeon: Vanessa Mason MD;  [facility];  Service: Gynecology Oncology   • ME LAPS TOTAL HYSTERECT 250 GM/< W/RMVL TUBE/OVARY N/A 2022    Procedure: HYSTERECTOMY LAPAROSCOPIC TOTAL ([address]) W/ ROBOTICS, BILATERAL SALPINGECTOMY, Bilateral PELVI SENTINAL  LYMPH NODE DISSECTION;  Surgeon: Vanessa Mason MD;  [facility];  Service: Gynecology Oncology     Family History   Problem Relation Age of Onset   • Ovarian cancer Mother 36   • Breast cancer Mother 44   • BRCA 1/2 Mother         positive, unsure if BRCA 1 or 2   • Cirrhosis Father    • Hepatitis Father         unsure of which type   • Ovarian cancer Sister 16        BRCA negative   • Leukemia Sister 8   • Lung cancer Sister 12   • Fibromyalgia Sister    • Asthma Sister    • Diabetes type II Brother    • Diabetes type II Brother    • No Known Problems Brother    • Colon cancer Brother          age 47   • Multiple sclerosis Brother    • No Known Problems Maternal Grandmother    • No Known Problems Maternal Grandfather    • No Known Problems Paternal Grandmother    • No Known Problems Paternal Grandfather    • No Known Problems Paternal Aunt    • No Known Problems Paternal Aunt    • No Known Problems Paternal Aunt    • No Known Problems Paternal Aunt    • No Known Problems Paternal Aunt    • No Known Problems Paternal Aunt    • No Known Problems Paternal Aunt        Objective:  /80 (BP Location: Left arm, Patient Position: Sitting, Cuff Size: Standard)   Pulse 82   Temp 98.3 °F (36.8 °C) (Temporal)   Ht 5' 8.94" (1.751 m)   Wt 90.4 kg (199 lb 6.4 oz)   LMP 10/05/2022 (Approximate)   SpO2 98%   BMI 29.50 kg/m²     No results found for this or any previous visit (from the past 1344 hour(s)). Physical Exam  Constitutional:       General: She is not in acute distress. Appearance: She is well-developed. She is not diaphoretic. HENT:      Head: Normocephalic and atraumatic. Right Ear: External ear normal. Tympanic membrane is bulging. Tympanic membrane is not erythematous. Left Ear: External ear normal. Tympanic membrane is bulging. Tympanic membrane is not erythematous. Ears:      Comments: Dizziness with Filemon hallpike      Nose: Nose normal.      Mouth/Throat:      Pharynx: No oropharyngeal exudate. Eyes:      General:         Right eye: No discharge. Left eye: No discharge. Conjunctiva/sclera: Conjunctivae normal.      Pupils: Pupils are equal, round, and reactive to light. Neck:      Thyroid: No thyromegaly. Cardiovascular:      Rate and Rhythm: Normal rate and regular rhythm. Heart sounds: Normal heart sounds. No murmur heard. No friction rub. No gallop. Pulmonary:      Effort: Pulmonary effort is normal. No respiratory distress. Breath sounds: Normal breath sounds. No stridor. No wheezing or rales. Abdominal:      General: Bowel sounds are normal. There is no distension. Palpations: Abdomen is soft. Tenderness: There is no abdominal tenderness. Musculoskeletal:      Cervical back: Normal range of motion and neck supple. Lymphadenopathy:      Cervical: No cervical adenopathy. Skin:     General: Skin is warm and dry. Findings: No erythema or rash. Neurological:      Mental Status: She is alert and oriented to person, place, and time. Psychiatric:         Behavior: Behavior normal.         Thought Content:  Thought content normal.         Judgment: Judgment normal.

## 2023-07-20 ENCOUNTER — APPOINTMENT (OUTPATIENT)
Dept: LAB | Facility: HOSPITAL | Age: 38
End: 2023-07-20
Payer: COMMERCIAL

## 2023-07-20 DIAGNOSIS — R42 DIZZINESS: ICD-10-CM

## 2023-07-20 LAB
ALBUMIN SERPL BCP-MCNC: 4.3 G/DL (ref 3.5–5)
ALP SERPL-CCNC: 58 U/L (ref 34–104)
ALT SERPL W P-5'-P-CCNC: 21 U/L (ref 7–52)
ANION GAP SERPL CALCULATED.3IONS-SCNC: 5 MMOL/L
AST SERPL W P-5'-P-CCNC: 19 U/L (ref 13–39)
BASOPHILS # BLD AUTO: 0.03 THOUSANDS/ÂΜL (ref 0–0.1)
BASOPHILS NFR BLD AUTO: 0 % (ref 0–1)
BILIRUB SERPL-MCNC: 0.77 MG/DL (ref 0.2–1)
BUN SERPL-MCNC: 9 MG/DL (ref 5–25)
CALCIUM SERPL-MCNC: 9.3 MG/DL (ref 8.4–10.2)
CHLORIDE SERPL-SCNC: 105 MMOL/L (ref 96–108)
CO2 SERPL-SCNC: 28 MMOL/L (ref 21–32)
CREAT SERPL-MCNC: 0.66 MG/DL (ref 0.6–1.3)
EOSINOPHIL # BLD AUTO: 0.03 THOUSAND/ÂΜL (ref 0–0.61)
EOSINOPHIL NFR BLD AUTO: 0 % (ref 0–6)
ERYTHROCYTE [DISTWIDTH] IN BLOOD BY AUTOMATED COUNT: 12.7 % (ref 11.6–15.1)
FERRITIN SERPL-MCNC: 28 NG/ML (ref 11–307)
GFR SERPL CREATININE-BSD FRML MDRD: 114 ML/MIN/1.73SQ M
GLUCOSE SERPL-MCNC: 85 MG/DL (ref 65–140)
HCT VFR BLD AUTO: 41.6 % (ref 34.8–46.1)
HGB BLD-MCNC: 13.6 G/DL (ref 11.5–15.4)
IMM GRANULOCYTES # BLD AUTO: 0.02 THOUSAND/UL (ref 0–0.2)
IMM GRANULOCYTES NFR BLD AUTO: 0 % (ref 0–2)
IRON SATN MFR SERPL: 62 % (ref 15–50)
IRON SERPL-MCNC: 211 UG/DL (ref 50–170)
LYMPHOCYTES # BLD AUTO: 1.52 THOUSANDS/ÂΜL (ref 0.6–4.47)
LYMPHOCYTES NFR BLD AUTO: 19 % (ref 14–44)
MCH RBC QN AUTO: 30.6 PG (ref 26.8–34.3)
MCHC RBC AUTO-ENTMCNC: 32.7 G/DL (ref 31.4–37.4)
MCV RBC AUTO: 94 FL (ref 82–98)
MONOCYTES # BLD AUTO: 0.51 THOUSAND/ÂΜL (ref 0.17–1.22)
MONOCYTES NFR BLD AUTO: 6 % (ref 4–12)
NEUTROPHILS # BLD AUTO: 6.03 THOUSANDS/ÂΜL (ref 1.85–7.62)
NEUTS SEG NFR BLD AUTO: 75 % (ref 43–75)
NRBC BLD AUTO-RTO: 0 /100 WBCS
PLATELET # BLD AUTO: 244 THOUSANDS/UL (ref 149–390)
PMV BLD AUTO: 10.2 FL (ref 8.9–12.7)
POTASSIUM SERPL-SCNC: 3.6 MMOL/L (ref 3.5–5.3)
PROT SERPL-MCNC: 7.4 G/DL (ref 6.4–8.4)
RBC # BLD AUTO: 4.44 MILLION/UL (ref 3.81–5.12)
SODIUM SERPL-SCNC: 138 MMOL/L (ref 135–147)
TIBC SERPL-MCNC: 343 UG/DL (ref 250–450)
TSH SERPL DL<=0.05 MIU/L-ACNC: 0.59 UIU/ML (ref 0.45–4.5)
WBC # BLD AUTO: 8.14 THOUSAND/UL (ref 4.31–10.16)

## 2023-07-20 PROCEDURE — 82728 ASSAY OF FERRITIN: CPT

## 2023-07-20 PROCEDURE — 85025 COMPLETE CBC W/AUTO DIFF WBC: CPT | Performed by: NURSE PRACTITIONER

## 2023-07-20 PROCEDURE — 84443 ASSAY THYROID STIM HORMONE: CPT | Performed by: NURSE PRACTITIONER

## 2023-07-20 PROCEDURE — 83550 IRON BINDING TEST: CPT

## 2023-07-20 PROCEDURE — 80053 COMPREHEN METABOLIC PANEL: CPT

## 2023-07-20 PROCEDURE — 36415 COLL VENOUS BLD VENIPUNCTURE: CPT

## 2023-07-20 PROCEDURE — 83540 ASSAY OF IRON: CPT

## 2023-07-21 DIAGNOSIS — R42 DIZZINESS: Primary | ICD-10-CM

## 2024-01-29 ENCOUNTER — PATIENT MESSAGE (OUTPATIENT)
Dept: OBGYN CLINIC | Facility: CLINIC | Age: 39
End: 2024-01-29

## 2024-01-29 ENCOUNTER — TELEPHONE (OUTPATIENT)
Dept: OBGYN CLINIC | Facility: CLINIC | Age: 39
End: 2024-01-29

## 2024-01-29 NOTE — TELEPHONE ENCOUNTER
"Pt called stating that she received a missed call from our office in regards to an appointment. Pt stated that she doesn't know in regards to what appointment because she hasn't missed any. I looked in the pts chart for a note from anyone in our office, and I checked her appt desk to see if she no showed for any appointments recently, but did not see anything. I asked the pt what the voicemail said or if there was a name and she stated that she was on the phone that she \"couldn't listen to it\". I kept looking to see if I could find anything, and in the background I heard her playing the voicemail and she said the name was Cheryl from [facility]. So I spoke with Cheryl and she said that she reached out to reschedule the pts upcoming appt on 3/12 with Dr. Cook for a yearly exam that the pt scheduled through [facility], due to it being only a 15 minute appt. I explained to pt what the issue was with her appt and that it had to be a 30 minute appt and she said, \"That's all this phone call was for?\" in a very hostile tone, to which I replied yes and proceeded to ask the pt is she had time to reschedule. Pt asked what was next available, so I went through [facility] schedule and got a date in April for her next available for a yearly. Pt got very hostile again and stated \"I need something sooner. I am about to be a year since my hysterectomy so I need to be seen sooner than that.\". I reiterated to the pt that there wasn't anything sooner and I even gave her the option of leaving that appt for a 15 minute, but that it wouldn't be for a yearly, it could just be for a \"follow up\" on her hysterectomy. To which she replied \"I will just keep that appointment. You guys have done this to me before and Dr. Cook still did the yearly so you can leave my appointment there.\"; I expressed to the pt that Dr. Cook will not do a yearly in 15 minutes and I was going to proceed with telling her that I could get her in with a different provider " "sooner, but she rudely cut me off and said \"I will reach out to Dr. Cook personally because I know her personally and she knows my history. I know who her boss is as well as I know who your boss is so I will reach out personally. What is your name again?\". I stated my name and she said \"You're the one upfront right?\" And then proceeded to hangup on me. Please advise on what to do.  "

## 2024-01-31 ENCOUNTER — TELEPHONE (OUTPATIENT)
Dept: OBGYN CLINIC | Facility: CLINIC | Age: 39
End: 2024-01-31

## 2024-01-31 NOTE — TELEPHONE ENCOUNTER
----- Message from Phyllis Cook MD sent at 1/30/2024  6:39 PM EST -----  Regarding: FW: Concerned   Contact: [phone]  Please change visit in march to a problem visit and contact pt to schedule her annual (which is scheduled in march in 15 minutes)  ----- Message -----  From: Nany Euceda  Sent: 1/30/2024   3:18 PM EST  To: Phyllis Cook MD  Subject: FW: Concerned                                      ----- Message -----  From: Telma Vallejo  Sent: 1/30/2024   3:16 PM EST  To: [facility]  Subject: Concerned                                        Thank you dr! Yes I spoke to someone else she explained that I scheduled a 15 instead. And I don’t mind waiting for a yearly I just was worried and didn’t no how to explain it to the First Lady I spoke to . But thank you I appreciate you contacting me I’ll see you soon.

## 2024-01-31 NOTE — TELEPHONE ENCOUNTER
Called pt to schedule annual visit per Dr Cook; pt has problem visit scheduled in march. Left message to call office back.

## 2024-02-15 ENCOUNTER — TELEPHONE (OUTPATIENT)
Dept: OBGYN CLINIC | Facility: CLINIC | Age: 39
End: 2024-02-15

## 2024-02-15 NOTE — TELEPHONE ENCOUNTER
----- Message from Nola Romero LPN sent at 2/15/2024  1:50 PM EST -----  Regarding: FW: Concerned   Contact: [phone]    ----- Message -----  From: Telma Vallejo  Sent: 2/15/2024   1:47 PM EST  To: [facility]  Subject: Concerned                                        Hi doctor I need a phone call please I just had some disturbing information and I only would like to speak to you not a supervisor or a nurse please

## 2024-02-15 NOTE — TELEPHONE ENCOUNTER
"I called the patient back.    She reports she got a phone call this morning from the office. She reports that her name was brought up in a staff meeting and was told that Yasmeen was being very unprofessional about the patient and told her that she wanted \"to fight her\" and that the supervisor (Anabel) was laughing about it when they were addressing the situation about what happened about her appointment. She was told that they said the appointment wasn't important.     The patient is very concerned that her situation was made a joke and she is very upset and she doesn't feel comfortable coming to the office and having a negative interaction.    I apologized to the patient and told her I was unaware of the situation and would follow up with the .    I did suggest to the patient that if she would feel more comfortable seeing me in the [place] office that I would be happy to see her there. Pt reports that would be her preference  "

## 2024-02-23 ENCOUNTER — OFFICE VISIT (OUTPATIENT)
Age: 39
End: 2024-02-23
Payer: COMMERCIAL

## 2024-02-23 VITALS — HEIGHT: 68 IN | BODY MASS INDEX: 30.52 KG/M2 | WEIGHT: 201.4 LBS

## 2024-02-23 DIAGNOSIS — N85.02 COMPLEX ATYPICAL ENDOMETRIAL HYPERPLASIA: ICD-10-CM

## 2024-02-23 DIAGNOSIS — R10.2 PELVIC PAIN: Primary | ICD-10-CM

## 2024-02-23 DIAGNOSIS — N94.10 DYSPAREUNIA, FEMALE: ICD-10-CM

## 2024-02-23 PROCEDURE — 99214 OFFICE O/P EST MOD 30 MIN: CPT | Performed by: OBSTETRICS & GYNECOLOGY

## 2024-02-23 RX ORDER — LIDOCAINE AND PRILOCAINE 25; 25 MG/G; MG/G
CREAM TOPICAL AS NEEDED
Qty: 30 G | Refills: 0 | Status: SHIPPED | OUTPATIENT
Start: 2024-02-23

## 2024-02-23 NOTE — PROGRESS NOTES
Assessment/Plan  Telma was seen today for pelvic pain.    Diagnoses and all orders for this visit:    Pelvic pain  -     US pelvis complete w transvaginal; Future  -     Urinalysis with microscopic; Future  -     Urine culture  Encouraged patient to keep calendar of symptoms as this can all be related to ovulatory pain  Although she no longer has menses she will still have ovulation and this can be cause of pelvic pain    Dyspareunia, female  -     lidocaine-prilocaine (EMLA) cream; Apply topically as needed for mild pain  Had been seen at pelvic floor PT x 2 visits but stopped going   We discussed importance of continued pelvic PT   We also discussed use of foreplay and  vaginal dilators as well as lubrication   Trial of EMLA cream    Suspect component of vaginismus      Complex atypical endometrial hyperplasia  - S/P robotic hysterectomy by Dr Kimberley Perez   Telma Vallejo is a 38 y.o. female here for a problem visit.  Patient is complaining of pelvic pain  . States this is not occurring all the time but when it does it is associated with breast tenderness  . She has though if related to ovulation but has not really kept track of when pain occurs  or if a pattern. Denies vaginal discharge or pain with urination but does desire to be evaluated for UTI . Most importantly has not been able to have intercourse in over one year because of pain worse with insertion. States she tried pelvic PT but only went to 2 sessions and then stopped / has not tried any lubrication     Patient Active Problem List   Diagnosis    Cyst of left breast    Depression with anxiety    Cystic acne    Iron deficiency anemia due to chronic blood loss    Anemia    HSV-2 seropositive    PTSD (post-traumatic stress disorder)    Upper respiratory tract infection    Complex atypical endometrial hyperplasia    Family history of cancer    S/P robot-assisted surgical procedure    Dysuria    Papular rash    Dizziness       Gynecologic  "History  Patient's last menstrual period was 10/05/2022 (approximate).  The current method of family planning is status post hysterectomy.    Past Medical History:   Diagnosis Date    Abdominal pain     Abnormal Pap smear of cervix     2018-+HPV;    Allergic     Anemia     Anxiety     and preop anxiety(provided emotional support) per pt--also has 4 yr old autistic son--his\" Dad does help pt when needed-very supportive\"    Asthma     Chest pain     per pt\"has gone to ED and had heart checked\" reports PCP feels related to anxiety\"    Chlamydia infection     treated in     Depression     per pt \"stopped taking her meds\"    Exercise involving stretching     also yoga    Family history of breast cancer     \"Mom and also has Ovarian Cancer\"    Heavy menses     \"and clots\"    History of transfusion     Indigestion     Low back pain     Medical marijuana use     Migraine     Migraine headache     Motion sickness     PONV (postoperative nausea and vomiting)     PTSD (post-traumatic stress disorder)      Past Surgical History:   Procedure Laterality Date     SECTION  2018    CYSTOSCOPY N/A 2022    Procedure: CYSTOSCOPY;  Surgeon: Moris Chu MD;  [facility];  Service: Gynecology Oncology    POLYPECTOMY N/A 10/20/2022    Procedure: POLYPECTOMY;  Surgeon: Phyllis Cook MD;  [facility];  Service: Gynecology    NC HYSTEROSCOPY BX ENDOMETRIUM&/POLYPC W/WO D&C N/A 10/20/2022    Procedure: D&C W/HYSTEROSCOPY;  Surgeon: Phyllis Cook MD;  [facility];  Service: Gynecology    NC INJECTION PROCEDURE LYMPHANGIOGRAPHY N/A 2022    Procedure: LYMPHOGRAPHY WITH INDOCYANINE GREEN (ICG);  Surgeon: Moris Chu MD;  [facility];  Service: Gynecology Oncology    NC LAPS TOTAL HYSTERECT 250 GM/< W/RMVL TUBE/OVARY N/A 2022    Procedure: HYSTERECTOMY LAPAROSCOPIC TOTAL (LTH) W/ ROBOTICS, BILATERAL SALPINGECTOMY, Bilateral PELVI SENTINAL  LYMPH NODE " DISSECTION;  Surgeon: Moris Chu MD;  [facility];  Service: Gynecology Oncology     Family History   Problem Relation Age of Onset    Ovarian cancer Mother 40    Breast cancer Mother 39    BRCA 1/2 Mother         positive, unsure if BRCA 1 or 2    Cirrhosis Father     Hepatitis Father         unsure of which type    Ovarian cancer Sister 17        BRCA negative    Leukemia Sister 10    Lung cancer Sister 16    Fibromyalgia Sister     Asthma Sister     Diabetes type II Brother     Diabetes type II Brother     No Known Problems Brother     Colon cancer Brother          age 54    Multiple sclerosis Brother     No Known Problems Maternal Grandmother     No Known Problems Maternal Grandfather     No Known Problems Paternal Grandmother     No Known Problems Paternal Grandfather     No Known Problems Paternal Aunt     No Known Problems Paternal Aunt     No Known Problems Paternal Aunt     No Known Problems Paternal Aunt     No Known Problems Paternal Aunt     No Known Problems Paternal Aunt     No Known Problems Paternal Aunt      Social History     Socioeconomic History    Marital status: Single     Spouse name: Xavi    Number of children: 1    Years of education: Not on file    Highest education level: High school graduate   Occupational History    Occupation:    Tobacco Use    Smoking status: Former     Current packs/day: 0.00     Average packs/day: 0.3 packs/day for 1 year (0.3 ttl pk-yrs)     Types: Cigarettes     Start date: 2017     Quit date: 2018     Years since quittin.1    Smokeless tobacco: Never    Tobacco comments:     Nicotine Vaping and Medical Marijuana Vaping --to cut back -and not to Vape DOS.   Vaping Use    Vaping status: Every Day    Substances: Nicotine, THC (oil), CBD   Substance and Sexual Activity    Alcohol use: Yes     Alcohol/week: 0.0 - 1.0 standard drinks of alcohol     Comment: 1 glass of wine monthly    Drug use: Not Currently      Types: Marijuana     Comment: medical marijuana-Vaping, still uses    Sexual activity: Not Currently     Partners: Male     Birth control/protection: None     Comment: lifetime partners: 5;  2013   Other Topics Concern    Not on file   Social History Narrative    Protestant: Spiritism    Accepts blood products     Social Determinants of Health     Financial Resource Strain: Not on file   Food Insecurity: Not on file   Transportation Needs: Not on file   Physical Activity: Not on file   Stress: Not on file   Social Connections: Not on file   Intimate Partner Violence: Not on file   Housing Stability: Not on file     Allergies   Allergen Reactions    Latex Hives and Throat Swelling       Current Outpatient Medications:     lidocaine-prilocaine (EMLA) cream, Apply topically as needed for mild pain, Disp: 30 g, Rfl: 0    predniSONE 20 mg tablet, Take 1 tablet (20 mg total) by mouth daily, Disp: 5 tablet, Rfl: 0    triamcinolone (KENALOG) 0.1 % cream, Apply topically 2 (two) times a day for 7 days (Patient not taking: Reported on 5/19/2023), Disp: 15 g, Rfl: 0    Review of Systems  Constitutional :no fever, feels well, no tiredness, no recent weight gain or loss  ENT: no ear ache, no loss of hearing, no nosebleeds or nasal discharge, no sore throat or hoarseness.  Cardiovascular: no complaints of slow or fast heart beat, no chest pain, no palpitations, no leg claudication or lower extremity edema.  Respiratory: no complaints of shortness of shortness of breath, no IRBY  Breasts:no complaints of breast pain, breast lump, or nipple discharge  Gastrointestinal: no complaints of abdominal pain, constipation, nausea, vomiting, or diarrhea or bloody stools  Genitourinary : as noted in HPI.  Musculoskeletal: no complaints of arthralgia, no myalgia, no joint swelling or stiffness, no limb pain or swelling.  Integumentary: no complaints of skin rash or lesion, itching or dry skin  Neurological: no complaints of headache, no  "confusion, no numbness or tingling, no dizziness or fainting     Objective     Ht 5' 8\" (1.727 m)   Wt 91.4 kg (201 lb 6.4 oz)   LMP 10/05/2022 (Approximate)   BMI 30.62 kg/m²     General:   appears stated age, cooperative, alert normal mood and affect/ anxious about pelvic exam    Lungs: clear to auscultation bilaterally   Abdomen: soft, non-tender, without masses or organomegaly   Vulva: normal, normal female genitalia, Bartholin's, Urethra, Olar normal, no lesions, normal female hair distribution, no clitoral enlargement   Vagina: normal vagina, no discharge, exudate, lesion, or erythema/ intolerance to  speculum exam    Urethra: normal   Cervix: Absent.   Uterus: uterus absent   Adnexa: no mass, fullness, tenderness   Lymphatic palpation of lymph nodes in neck, axilla, groin and/or other locations: no lymphadenopathy or masses noted   Skin normal skin turgor and no rashes.   Psychiatric orientation to person, place, and time: normal. mood and affect: normal      "

## 2024-04-11 ENCOUNTER — APPOINTMENT (OUTPATIENT)
Dept: LAB | Facility: HOSPITAL | Age: 39
End: 2024-04-11
Payer: COMMERCIAL

## 2024-04-11 DIAGNOSIS — R10.2 PELVIC PAIN: ICD-10-CM

## 2024-04-11 LAB
BACTERIA UR QL AUTO: ABNORMAL /HPF
BILIRUB UR QL STRIP: NEGATIVE
CLARITY UR: ABNORMAL
COLOR UR: ABNORMAL
GLUCOSE UR STRIP-MCNC: NEGATIVE MG/DL
HGB UR QL STRIP.AUTO: 25
KETONES UR STRIP-MCNC: NEGATIVE MG/DL
LEUKOCYTE ESTERASE UR QL STRIP: 500
MUCOUS THREADS UR QL AUTO: ABNORMAL
NITRITE UR QL STRIP: NEGATIVE
NON-SQ EPI CELLS URNS QL MICRO: ABNORMAL /HPF
PH UR STRIP.AUTO: 5 [PH]
PROT UR STRIP-MCNC: ABNORMAL MG/DL
RBC #/AREA URNS AUTO: ABNORMAL /HPF
SP GR UR STRIP.AUTO: 1.02 (ref 1–1.04)
UROBILINOGEN UA: NEGATIVE MG/DL
WBC #/AREA URNS AUTO: ABNORMAL /HPF

## 2024-04-13 LAB — BACTERIA UR CULT: NORMAL

## 2024-04-19 ENCOUNTER — TELEPHONE (OUTPATIENT)
Age: 39
End: 2024-04-19

## 2024-04-19 NOTE — TELEPHONE ENCOUNTER
Pt called. Pt has urine culture collected 04/11 and results are available. Pt looking for results to be translated. Pt made aware of message sent.

## 2024-04-19 NOTE — TELEPHONE ENCOUNTER
PT called regarding urinalysis results from 4/12/24.  Phone # of ordering physician was Dr. Kimberley georges.

## 2024-04-24 DIAGNOSIS — R82.90 ABNORMAL URINE ODOR: Primary | ICD-10-CM

## 2024-06-19 ENCOUNTER — TELEPHONE (OUTPATIENT)
Age: 39
End: 2024-06-19

## 2024-06-19 NOTE — TELEPHONE ENCOUNTER
Patient has concerns about doing the transvaginal US (US pelvis complete w transvaginal). Patient states it was very painful for her when she had the transvaginal done a few years ago and experiences PTSD. She does not wish to experience that pain again and is looking for an alternative. Patient would like to know if an external US can be done instead of the transvaginal. If an external US cannot be done, patient is asking if it is possible she can be put to sleep for the transvaginal.     She is also inquiring about the Urine culture and Urinalysis with microscopic, patient is asking if she still need to complete these so that she can go and have them done.

## 2024-06-21 ENCOUNTER — TELEPHONE (OUTPATIENT)
Dept: OBGYN CLINIC | Facility: MEDICAL CENTER | Age: 39
End: 2024-06-21

## 2024-06-21 NOTE — TELEPHONE ENCOUNTER
Pt returned VM regarding US tried to transfer no one was available at the moment pt says ok to call her back to discuss when available.

## 2024-10-05 ENCOUNTER — APPOINTMENT (EMERGENCY)
Dept: RADIOLOGY | Facility: HOSPITAL | Age: 39
End: 2024-10-05
Payer: COMMERCIAL

## 2024-10-05 ENCOUNTER — HOSPITAL ENCOUNTER (EMERGENCY)
Facility: HOSPITAL | Age: 39
Discharge: HOME | End: 2024-10-05
Attending: EMERGENCY MEDICINE
Payer: COMMERCIAL

## 2024-10-05 VITALS
RESPIRATION RATE: 18 BRPM | OXYGEN SATURATION: 97 % | WEIGHT: 204.59 LBS | HEART RATE: 86 BPM | DIASTOLIC BLOOD PRESSURE: 80 MMHG | TEMPERATURE: 98.2 F | SYSTOLIC BLOOD PRESSURE: 123 MMHG | BODY MASS INDEX: 31.11 KG/M2

## 2024-10-05 DIAGNOSIS — S99.922A INJURY OF TOE ON LEFT FOOT, INITIAL ENCOUNTER: Primary | ICD-10-CM

## 2024-10-05 PROCEDURE — 99283 EMERGENCY DEPT VISIT LOW MDM: CPT

## 2024-10-05 PROCEDURE — 99284 EMERGENCY DEPT VISIT MOD MDM: CPT | Performed by: EMERGENCY MEDICINE

## 2024-10-05 PROCEDURE — 73630 X-RAY EXAM OF FOOT: CPT

## 2024-10-05 RX ORDER — NAPROXEN 500 MG/1
500 TABLET ORAL 2 TIMES DAILY WITH MEALS
Qty: 20 TABLET | Refills: 0 | Status: SHIPPED | OUTPATIENT
Start: 2024-10-05 | End: 2024-10-15

## 2024-10-05 NOTE — DISCHARGE INSTRUCTIONS
Take the Naprosyn twice daily for the next 5-10 days, you can also take 2 Aleve (440mg total) if you have this.    The number for podiatry is included in these discharge instructions, call to be seen in the office for further evaluation and management.

## 2024-10-06 NOTE — ED PROVIDER NOTES
Final diagnoses:   Injury of toe on left foot, initial encounter     ED Disposition       ED Disposition   Discharge    Condition   Stable    Date/Time   Sat Oct 5, 2024  7:42 PM    Comment   Telma Vallejo discharge to home/self care.                   Assessment & Plan       Medical Decision Making  1. Toe injury - Patient with bruising and swelling over the area. Will x-ray the foot, place in a surgical shoe and have follow up with podiatry.     Problems Addressed:  Injury of toe on left foot, initial encounter: acute illness or injury    Amount and/or Complexity of Data Reviewed  Radiology: ordered and independent interpretation performed. Decision-making details documented in ED Course.    Risk  Prescription drug management.        ED Course as of 10/05/24 2249   Sat Oct 05, 2024   1941 X-ray(s) personally evaluated, interpretation: Plain film of Left foot demonstrates No fracture or dislocation          Medications - No data to display    ED Risk Strat Scores                           SBIRT 20yo+      Flowsheet Row Most Recent Value   Initial Alcohol Screen: US AUDIT-C     1. How often do you have a drink containing alcohol? 0 Filed at: 10/05/2024 1828   2. How many drinks containing alcohol do you have on a typical day you are drinking?  0 Filed at: 10/05/2024 1828   3a. Male UNDER 65: How often do you have five or more drinks on one occasion? 0 Filed at: 10/05/2024 1828   3b. FEMALE Any Age, or MALE 65+: How often do you have 4 or more drinks on one occassion? 0 Filed at: 10/05/2024 1828   Audit-C Score 0 Filed at: 10/05/2024 1828   AYLA: How many times in the past year have you...    Used an illegal drug or used a prescription medication for non-medical reasons? Never Filed at: 10/05/2024 1828                            History of Present Illness       Chief Complaint   Patient presents with    Toe Pain     Here with middle toe pain on left foot after dropping metal chair on it last night.        Past  "Medical History:   Diagnosis Date    Abdominal pain     Abnormal Pap smear of cervix     2018-+HPV;    Allergic     Anemia     Anxiety     and preop anxiety(provided emotional support) per pt--also has 4 yr old autistic son--his\" Dad does help pt when needed-very supportive\"    Asthma     Chest pain     per pt\"has gone to ED and had heart checked\" reports PCP feels related to anxiety\"    Chlamydia infection     treated in 2015    Depression     per pt \"stopped taking her meds\"    Exercise involving stretching     also yoga    Family history of breast cancer     \"Mom and also has Ovarian Cancer\"    Heavy menses     \"and clots\"    History of transfusion     Indigestion     Low back pain     Medical marijuana use     Migraine     Migraine headache     Motion sickness     PONV (postoperative nausea and vomiting)     PTSD (post-traumatic stress disorder)       Past Surgical History:   Procedure Laterality Date     SECTION  2018    CYSTOSCOPY N/A 2022    Procedure: CYSTOSCOPY;  Surgeon: Moris Chu MD;  [facility];  Service: Gynecology Oncology    POLYPECTOMY N/A 10/20/2022    Procedure: POLYPECTOMY;  Surgeon: Phyllis Cook MD;  [facility];  Service: Gynecology    ID HYSTEROSCOPY BX ENDOMETRIUM&/POLYPC W/WO D&C N/A 10/20/2022    Procedure: D&C W/HYSTEROSCOPY;  Surgeon: Phyllis Cook MD;  [facility];  Service: Gynecology    ID INJECTION PROCEDURE LYMPHANGIOGRAPHY N/A 2022    Procedure: LYMPHOGRAPHY WITH INDOCYANINE GREEN (ICG);  Surgeon: Moris Chu MD;  [facility];  Service: Gynecology Oncology    ID LAPS TOTAL HYSTERECT 250 GM/< W/RMVL TUBE/OVARY N/A 2022    Procedure: HYSTERECTOMY LAPAROSCOPIC TOTAL (LTH) W/ ROBOTICS, BILATERAL SALPINGECTOMY, Bilateral PELVI SENTINAL  LYMPH NODE DISSECTION;  Surgeon: Moris Chu MD;  [facility];  Service: Gynecology Oncology      Family History   Problem Relation Age " of Onset    Ovarian cancer Mother 40    Breast cancer Mother 39    BRCA 1/2 Mother         positive, unsure if BRCA 1 or 2    Cirrhosis Father     Hepatitis Father         unsure of which type    Ovarian cancer Sister 17        BRCA negative    Leukemia Sister 10    Lung cancer Sister 16    Fibromyalgia Sister     Asthma Sister     Diabetes type II Brother     Diabetes type II Brother     No Known Problems Brother     Colon cancer Brother          age 54    Multiple sclerosis Brother     No Known Problems Maternal Grandmother     No Known Problems Maternal Grandfather     No Known Problems Paternal Grandmother     No Known Problems Paternal Grandfather     No Known Problems Paternal Aunt     No Known Problems Paternal Aunt     No Known Problems Paternal Aunt     No Known Problems Paternal Aunt     No Known Problems Paternal Aunt     No Known Problems Paternal Aunt     No Known Problems Paternal Aunt       Social History     Tobacco Use    Smoking status: Former     Current packs/day: 0.00     Average packs/day: 0.3 packs/day for 1 year (0.3 ttl pk-yrs)     Types: Cigarettes     Start date: 2017     Quit date: 2018     Years since quittin.7    Smokeless tobacco: Never    Tobacco comments:     Nicotine Vaping and Medical Marijuana Vaping --to cut back -and not to Vape DOS.   Vaping Use    Vaping status: Every Day    Substances: Nicotine, THC (oil), CBD   Substance Use Topics    Alcohol use: Yes     Alcohol/week: 0.0 - 1.0 standard drinks of alcohol     Comment: 1 glass of wine monthly    Drug use: Not Currently     Types: Marijuana     Comment: medical marijuana-Vaping, still uses      E-Cigarette/Vaping    E-Cigarette Use Current Every Day User     Cartridges/Day CBD oil pen     Comments 3 times daily for anxiety       E-Cigarette/Vaping Substances    Nicotine Yes     THC Yes oil    CBD Yes     Flavoring No     Other No     Unknown No       I have reviewed and agree with the history as documented.      40 YO female presents with pain in the Left middle toe. States this has been present since last night when a metal chair fell on it. She had immediate pain and has since developed swelling and bruising over the digit. She took acetaminophen and Aleve for this. States it is worse with ambulation. She denies other injury. Pt denies CP/SOB/F/C/N/V/D/C, no dysuria, burning on urination or blood in urine.       History provided by:  Patient   used: No        Review of Systems   Constitutional:  Negative for chills, fatigue and fever.   HENT:  Negative for dental problem.    Eyes:  Negative for visual disturbance.   Respiratory:  Negative for shortness of breath.    Cardiovascular:  Negative for chest pain.   Gastrointestinal:  Negative for abdominal pain, diarrhea and vomiting.   Genitourinary:  Negative for dysuria and frequency.   Musculoskeletal:  Positive for arthralgias.   Skin:  Negative for rash.   Neurological:  Negative for dizziness, weakness and light-headedness.   Psychiatric/Behavioral:  Negative for agitation, behavioral problems and confusion.    All other systems reviewed and are negative.          Objective       ED Triage Vitals [10/05/24 1825]   Temperature Pulse Blood Pressure Respirations SpO2 Patient Position - Orthostatic VS   98.2 °F (36.8 °C) 86 123/80 18 97 % Sitting      Temp Source Heart Rate Source BP Location FiO2 (%) Pain Score    Oral Monitor Right arm -- --      Vitals      Date and Time Temp Pulse SpO2 Resp BP Pain Score FACES Pain Rating User   10/05/24 1825 98.2 °F (36.8 °C) 86 97 % 18 123/80 -- -- EK            Physical Exam  Vitals and nursing note reviewed.   Constitutional:       Appearance: Normal appearance.   HENT:      Head: Normocephalic and atraumatic.   Eyes:      Extraocular Movements: Extraocular movements intact.      Conjunctiva/sclera: Conjunctivae normal.   Cardiovascular:      Rate and Rhythm: Normal rate.   Pulmonary:      Effort: Pulmonary  effort is normal.   Abdominal:      General: There is no distension.   Musculoskeletal:         General: Normal range of motion.      Cervical back: Normal range of motion.      Comments: Bruising noted over the Left middle toe; tender to palpation.   Skin:     Findings: No rash.   Neurological:      General: No focal deficit present.      Mental Status: She is alert.      Cranial Nerves: No cranial nerve deficit.   Psychiatric:         Mood and Affect: Mood normal.         Results Reviewed       None            XR foot 3+ views LEFT   ED Interpretation by Compa Savage MD (10/05 1941)   No fracture or dislocation.          Procedures    ED Medication and Procedure Management   Prior to Admission Medications   Prescriptions Last Dose Informant Patient Reported? Taking?   lidocaine-prilocaine (EMLA) cream   No No   Sig: Apply topically as needed for mild pain   predniSONE 20 mg tablet   No No   Sig: Take 1 tablet (20 mg total) by mouth daily   triamcinolone (KENALOG) 0.1 % cream   No No   Sig: Apply topically 2 (two) times a day for 7 days   Patient not taking: Reported on 5/19/2023      Facility-Administered Medications: None     Discharge Medication List as of 10/5/2024  7:43 PM        START taking these medications    Details   naproxen (NAPROSYN) 500 mg tablet Take 1 tablet (500 mg total) by mouth 2 (two) times a day with meals for 10 days, Starting Sat 10/5/2024, Until Tue 10/15/2024, Normal           CONTINUE these medications which have NOT CHANGED    Details   lidocaine-prilocaine (EMLA) cream Apply topically as needed for mild pain, Starting Fri 2/23/2024, Normal      predniSONE 20 mg tablet Take 1 tablet (20 mg total) by mouth daily, Starting Mon 7/17/2023, Normal      triamcinolone (KENALOG) 0.1 % cream Apply topically 2 (two) times a day for 7 days, Starting Fri 3/10/2023, Until Fri 3/17/2023, Normal           No discharge procedures on file.  ED SEPSIS DOCUMENTATION   Time reflects when diagnosis  was documented in both MDM as applicable and the Disposition within this note       Time User Action Codes Description Comment    10/5/2024  7:42 PM Compa Savage Add [Z85.247J] Injury of toe on left foot, initial encounter                  Compa Savage MD  10/05/24 3929

## 2025-02-06 ENCOUNTER — TELEPHONE (OUTPATIENT)
Dept: INTERNAL MEDICINE CLINIC | Facility: OTHER | Age: 40
End: 2025-02-06

## 2025-02-06 NOTE — TELEPHONE ENCOUNTER
Attempted to contact pt to make appt, when she answered the phone and I said where I was calling from she hung up

## 2025-05-14 ENCOUNTER — TELEPHONE (OUTPATIENT)
Age: 40
End: 2025-05-14

## 2025-05-14 NOTE — TELEPHONE ENCOUNTER
Patient called in and said she had all of her parts removed about 2 years ago. Patient never went to follow up appt or got ultrasound done as she was scared since she had cancer and they just removed everything. Patient is looking for another ultrasound order her appt is scheduled for next available in July. Please advise and any further concerns patient can be reached at [phone]  Thank You

## 2025-05-15 DIAGNOSIS — R10.2 PELVIC PAIN: Primary | ICD-10-CM

## 2025-05-21 ENCOUNTER — OFFICE VISIT (OUTPATIENT)
Age: 40
End: 2025-05-21
Payer: COMMERCIAL

## 2025-05-21 VITALS
SYSTOLIC BLOOD PRESSURE: 112 MMHG | HEART RATE: 92 BPM | HEIGHT: 69 IN | TEMPERATURE: 97.4 F | WEIGHT: 193.6 LBS | BODY MASS INDEX: 28.68 KG/M2 | DIASTOLIC BLOOD PRESSURE: 80 MMHG | OXYGEN SATURATION: 98 %

## 2025-05-21 DIAGNOSIS — R10.2 VAGINAL PAIN: ICD-10-CM

## 2025-05-21 DIAGNOSIS — F41.9 ANXIETY: ICD-10-CM

## 2025-05-21 DIAGNOSIS — R07.9 CHEST PAIN, UNSPECIFIED TYPE: Primary | ICD-10-CM

## 2025-05-21 DIAGNOSIS — Z13.1 SCREENING FOR DIABETES MELLITUS: ICD-10-CM

## 2025-05-21 DIAGNOSIS — D50.0 IRON DEFICIENCY ANEMIA DUE TO CHRONIC BLOOD LOSS: ICD-10-CM

## 2025-05-21 DIAGNOSIS — Z13.220 SCREENING CHOLESTEROL LEVEL: ICD-10-CM

## 2025-05-21 PROCEDURE — 99214 OFFICE O/P EST MOD 30 MIN: CPT | Performed by: INTERNAL MEDICINE

## 2025-05-21 PROCEDURE — 93000 ELECTROCARDIOGRAM COMPLETE: CPT | Performed by: INTERNAL MEDICINE

## 2025-05-21 RX ORDER — LIDOCAINE 40 MG/G
CREAM TOPICAL AS NEEDED
Qty: 60 G | Refills: 1 | Status: SHIPPED | OUTPATIENT
Start: 2025-05-21

## 2025-05-21 NOTE — PROGRESS NOTES
Name: Telma Vallejo      : 1985      MRN: [id number]  Encounter Provider: Rosario Purdy MD  Encounter Date: 2025   [facility]    :  Assessment & Plan  Chest pain, unspecified type    Orders:  •  POCT ECG  •  CT chest wo contrast; Future  •  Comprehensive metabolic panel; Future    Iron deficiency anemia due to chronic blood loss        Orders:  •  CBC and differential; Future    Anxiety    Orders:  •  TSH, 3rd generation with Free T4 reflex; Future    Vaginal pain    Orders:  •  lidocaine (LMX) 4 % cream; Apply topically as needed for mild pain    Screening for diabetes mellitus    Orders:  •  Hemoglobin A1C; Future    Screening cholesterol level    Orders:  •  Lipid Panel with Direct LDL reflex; Future      Assessment & Plan  1. Chest pain.  - The EKG results are within normal limits.  - Reports of chest tightness, shortness of breath, and numbness in the arm.  - A comprehensive workup will be conducted, including a CBC, CMP, A1c, lipid panel, TSH, CT scan of the chest, and an echocardiogram.  - Screening for diabetes and cholesterol levels has been ordered.    EKG: normal EKG, normal sinus rhythm, no previous tracings available for review  2. Dyspareunia  - Reports significant pain during intercourse and during previous internal ultrasounds.  - A prescription for lidocaine 4% cream has been provided for use during her transabdominal ultrasound procedure.  - Advised to apply the cream internally prior to the procedure to alleviate pain.  - Referral to OB/GYN for further evaluation and management.    3. Health Maintenance.  - A follow-up physical examination will be scheduled.  - Patient has been advised to complete all pending appointments, including a mammogram and OB/GYN visit.  - Encouraged to maintain regular health check-ups and screenings.    4. Anxiety.  - Reports of anxiety and previous diagnosis of anxiety attacks.  - Advised  to monitor symptoms and consider discussing further management options if symptoms persist.  - Encouraged to maintain a healthy lifestyle and manage stress effectively.    Chief Complaint   Patient presents with   • Follow-up     Patient is having chest pain for a couple months cardiac history runs in family. Patient has been shaky She also has been getting headaches   • Health Screening     Annual physical needs to be scheduled  phq-9 score 4 negative           History of Present Illness     History of Present Illness  The patient presents for evaluation of chest pain, dyspareunia, and health maintenance.    She reports experiencing chest pain, which she attributes to anxiety. The onset of her symptoms was approximately 2 months ago. She has a family history of heart disease, with her grandmother having passed away from the condition. She has been diagnosed with anxiety attacks in the past. She does not report any current feelings of depression or stress. She has an upcoming appointment next month for a recheck related to her surgery. She has not seen her OB/GYN in years and is unable to engage in sexual activity due to pain. She has switched her GYN provider and has an appointment scheduled soon. Her sister suggested that the pain could be due to scar tissue, and she was supposed to undergo therapy for it but found the therapy uncomfortable. She has not had an ultrasound due to the pain associated with it. She has an upcoming appointment for a transabdominal ultrasound. She has been informed that sedation is not an option for the procedure. She has tried using lidocaine cream, which resulted in minor bleeding.    She works from home and spends most of her day sitting at her desk, talking to people over the computer. She occasionally uses a weight to support her back. She finds it difficult to wear bras for extended periods as it causes tightness in her chest cavity, leading to difficulty breathing. She also  experiences arm pain, numbness, and involuntary finger movements. She occasionally feels short of breath. She smokes marijuana occasionally, particularly at night, which helps calm her anxiety. She does not smoke cigarettes. She has a severe cough that causes a burning sensation when she coughs. She also experiences acid reflux frequently. She feels the need to move around when sitting comfortably, as she feels her blood pressure might be rising. She uses a treadmill and does not experience pain while walking around. The pain can occur at any time, even when she is just sitting, and is accompanied by back pain and shortness of breath. She is unsure about her cholesterol levels and admits to not eating well.    She has a 7-year-old son who is autistic and requires her constant attention. She no longer menstruates following a hysterectomy but retains her ovaries. She wishes to have a daughter and plans to discuss this with her OB/GYN. She is aware that she needs to get a mammogram and is trying to catch up on her appointments.    PAST SURGICAL HISTORY:  - Hysterectomy  - Polypectomy    SOCIAL HISTORY  She smokes marijuana sometimes but does not smoke cigarettes.    FAMILY HISTORY  Her grandmother passed away from heart disease.  Her sister and mother had ovarian cancer, and her mother had something in her brain.  Review of Systems   Constitutional:  Positive for diaphoresis.   Respiratory:  Positive for chest tightness and shortness of breath.    Cardiovascular:  Positive for chest pain.   Musculoskeletal:  Positive for arthralgias and back pain.   Psychiatric/Behavioral:  The patient is nervous/anxious.    All other systems reviewed and are negative.    Past Medical History[1]  Past Surgical History[2]  Family History[3]  Social History[4]  Medications[5]  Allergies   Allergen Reactions   • Latex Hives and Throat Swelling     Immunization History   Administered Date(s) Administered   • HPV Quadrivalent 06/17/2010,  "2010, 2010   • Hep B, adult 2018, 03/15/2018   • INFLUENZA 12/15/2011, 10/18/2012   • Td (adult), adsorbed 2012   • Tdap 2018   • Tuberculin Skin Test-PPD Intradermal 2019     Objective   /80 (BP Location: Left arm, Patient Position: Sitting, Cuff Size: Standard)   Pulse 92   Temp (!) 97.4 °F (36.3 °C) (Temporal)   Ht 5' 8.54\" (1.741 m) Comment: no shoes  Wt 87.8 kg (193 lb 9.6 oz)   LMP 10/05/2022 (Approximate)   SpO2 98%   BMI 28.97 kg/m²     Physical Exam    Physical Exam    Gen: anxious about her pain  Heent: atraumatic, normocephalic  Mouth: is moist  Neck: is supple, no JVD, no carotid bruits.   Heart: is regular Normal s1, s2,  Lungs: are clear to auscultation    Ext: no edema, distal pulses normal  Skin: no rashes, ulcers  Mood: normal affect  Neuro: AAOx3            [1]  Past Medical History:  Diagnosis Date   • Abdominal pain    • Abnormal Pap smear of cervix     2018-+HPV;   • Allergic    • Anemia    • Anxiety     and preop anxiety(provided emotional support) per pt--also has 4 yr old autistic son--his\" Dad does help pt when needed-very supportive\"   • Asthma    • Chest pain     per pt\"has gone to ED and had heart checked\" reports PCP feels related to anxiety\"   • Chlamydia infection     treated in    • Depression     per pt \"stopped taking her meds\"   • Exercise involving stretching     also yoga   • Family history of breast cancer     \"Mom and also has Ovarian Cancer\"   • Heavy menses     \"and clots\"   • History of transfusion    • Indigestion    • Low back pain    • Medical marijuana use    • Migraine    • Migraine headache    • Motion sickness    • PONV (postoperative nausea and vomiting)    • PTSD (post-traumatic stress disorder)    [2]  Past Surgical History:  Procedure Laterality Date   •  SECTION  2018   • CYSTOSCOPY N/A 2022    Procedure: CYSTOSCOPY;  Surgeon: Moris Chu MD;  [facility];  Service: " Gynecology Oncology   • POLYPECTOMY N/A 10/20/2022    Procedure: POLYPECTOMY;  Surgeon: Phyllis Cook MD;  [facility];  Service: Gynecology   • WI HYSTEROSCOPY BX ENDOMETRIUM&/POLYPC W/WO D&C N/A 10/20/2022    Procedure: D&C W/HYSTEROSCOPY;  Surgeon: Phyllis Cook MD;  [facility];  Service: Gynecology   • WI INJECTION PROCEDURE LYMPHANGIOGRAPHY N/A 2022    Procedure: LYMPHOGRAPHY WITH INDOCYANINE GREEN (ICG);  Surgeon: Moris Chu MD;  [facility];  Service: Gynecology Oncology   • WI LAPS TOTAL HYSTERECT 250 GM/< W/RMVL TUBE/OVARY N/A 2022    Procedure: HYSTERECTOMY LAPAROSCOPIC TOTAL (LTH) W/ ROBOTICS, BILATERAL SALPINGECTOMY, Bilateral PELVI SENTINAL  LYMPH NODE DISSECTION;  Surgeon: Moris Chu MD;  [facility];  Service: Gynecology Oncology   [3]  Family History  Problem Relation Name Age of Onset   • Ovarian cancer Mother Yoni 40   • Breast cancer Mother Yoni 39   • BRCA 1/2 Mother Yoni         positive, unsure if BRCA 1 or 2   • Cirrhosis Father     • Hepatitis Father          unsure of which type   • Ovarian cancer Sister brodie 17        BRCA negative   • Leukemia Sister brodie 10   • Lung cancer Sister brodie 16   • Fibromyalgia Sister Lucia    • Asthma Sister Lucia    • Diabetes type II Brother Manny    • Diabetes type II Brother iMchael    • No Known Problems Brother Darryn    • Colon cancer Brother Jos          age 54   • Multiple sclerosis Brother Reed    • No Known Problems Maternal Grandmother     • No Known Problems Maternal Grandfather     • No Known Problems Paternal Grandmother     • No Known Problems Paternal Grandfather     • No Known Problems Paternal Aunt     • No Known Problems Paternal Aunt     • No Known Problems Paternal Aunt     • No Known Problems Paternal Aunt     • No Known Problems Paternal Aunt     • No Known Problems Paternal Aunt     • No Known Problems Paternal Aunt     [4]  Social  History  Tobacco Use   • Smoking status: Former     Current packs/day: 0.00     Average packs/day: 0.3 packs/day for 1 year (0.3 ttl pk-yrs)     Types: Cigarettes     Start date: 2017     Quit date: 2018     Years since quittin.4   • Smokeless tobacco: Never   • Tobacco comments:     Nicotine Vaping and Medical Marijuana Vaping --to cut back -and not to Vape DOS.   Vaping Use   • Vaping status: Former   • Start date: 2023   • Quit date: 2024   • Substances: Nicotine, THC (oil), CBD   Substance and Sexual Activity   • Alcohol use: Yes     Alcohol/week: 0.0 - 1.0 standard drinks of alcohol     Comment: 1 glass of wine monthly   • Drug use: Yes     Types: Marijuana     Comment: medical marijuana-Vaping, still uses   • Sexual activity: Not Currently     Partners: Male     Birth control/protection: None     Comment: lifetime partners: 5;     [5]  Current Outpatient Medications on File Prior to Visit   Medication Sig   • [DISCONTINUED] lidocaine-prilocaine (EMLA) cream Apply topically as needed for mild pain (Patient not taking: Reported on 2025)   • [DISCONTINUED] naproxen (NAPROSYN) 500 mg tablet Take 1 tablet (500 mg total) by mouth 2 (two) times a day with meals for 10 days (Patient not taking: Reported on 2025)   • [DISCONTINUED] triamcinolone (KENALOG) 0.1 % cream Apply topically 2 (two) times a day for 7 days (Patient not taking: Reported on 2023)

## 2025-05-22 ENCOUNTER — NURSE TRIAGE (OUTPATIENT)
Age: 40
End: 2025-05-22

## 2025-05-22 NOTE — TELEPHONE ENCOUNTER
"FOLLOW UP: Please forward to Dr. Purdy to review     REASON FOR CONVERSATION: Chest Pain    SYMPTOMS: Patient was seen in office yesterday for chest pain. Patient experienced a chest pain episode at 2:20pm today that lasted 1.5min. Patient stated during this episode, she was having middle chest pain, 10/10 that radiated to her back near her left shoulder. She also endorsed shakes, shortness of breath, and sweating. Is not currently experiencing any of these symptoms. Patient has chest ct scheduled for 5/27/25. Advised patient if chest pain episode happens again and lasts 5min or longer, she needs to go to the ED immediately. Also advised patient she can call us back with worsening symptoms. Patient verbalized understanding.     OTHER: N/A    DISPOSITION: Home Care    Reason for Disposition   Chest pain(s) lasting a few seconds    Answer Assessment - Initial Assessment Questions  1. LOCATION: \"Where does it hurt?\"        Middle of chest near collar bone, right underneath  2. RADIATION: \"Does the pain go anywhere else?\" (e.g., into neck, jaw, arms, back)      Back, near L shoulder  3. ONSET: \"When did the chest pain begin?\" (Minutes, hours or days)       Happened around 2:20pm  4. PATTERN: \"Does the pain come and go, or has it been constant since it started?\"  \"Does it get worse with exertion?\"       Intermittent, notices it happens more with relaxation or emotional distress   5. DURATION: \"How long does it last\" (e.g., seconds, minutes, hours)      1.5min  6. SEVERITY: \"How bad is the pain?\"  (e.g., Scale 1-10; mild, moderate, or severe)      10/10  7. CARDIAC RISK FACTORS: \"Do you have any history of heart problems or risk factors for heart disease?\" (e.g., angina, prior heart attack; diabetes, high blood pressure, high cholesterol, smoker, or strong family history of heart disease)      Denies  8. PULMONARY RISK FACTORS: \"Do you have any history of lung disease?\"  (e.g., blood clots in lung, asthma, emphysema, " "birth control pills)      Denies  9. CAUSE: \"What do you think is causing the chest pain?\"      Unsure  10. OTHER SYMPTOMS: \"Do you have any other symptoms?\" (e.g., dizziness, nausea, vomiting, sweating, fever, difficulty breathing, cough)        Shakes, shortness of breath, sweating   11. PREGNANCY: \"Is there any chance you are pregnant?\" \"When was your last menstrual period?\"        N/A    Protocols used: Chest Pain-Adult-[place]    "

## 2025-05-22 NOTE — TELEPHONE ENCOUNTER
Per nurse, currently asymptomatic, thinks she might have had a panic attack. Will go to ER if has the symptoms again

## 2025-05-23 ENCOUNTER — TELEPHONE (OUTPATIENT)
Age: 40
End: 2025-05-23

## 2025-05-23 NOTE — TELEPHONE ENCOUNTER
I called central scheduling spoke with Nola. ct scan was cancelled because test needs a prior authorization. I called patient to notify her test needs prior authorization and to call central scheduling back to make a appointment for 10 days out. If she has any chest pains proceed to the emergency room.

## 2025-05-23 NOTE — TELEPHONE ENCOUNTER
Patient received call from CT department that her CT of chest that was scheduled for Tuesday 5/27/25 is cancelled and will be 10 days out. Department asked that PCP or covering provider call department to request it be done sooner.

## 2025-05-27 ENCOUNTER — HOSPITAL ENCOUNTER (OUTPATIENT)
Dept: ULTRASOUND IMAGING | Facility: HOSPITAL | Age: 40
Discharge: HOME | End: 2025-05-27
Attending: OBSTETRICS & GYNECOLOGY
Payer: COMMERCIAL

## 2025-05-27 ENCOUNTER — APPOINTMENT (OUTPATIENT)
Dept: CT IMAGING | Facility: HOSPITAL | Age: 40
End: 2025-05-27
Attending: INTERNAL MEDICINE
Payer: COMMERCIAL

## 2025-05-27 DIAGNOSIS — R10.2 PELVIC PAIN: ICD-10-CM

## 2025-05-27 PROCEDURE — 76856 US EXAM PELVIC COMPLETE: CPT

## 2025-05-28 ENCOUNTER — TELEPHONE (OUTPATIENT)
Age: 40
End: 2025-05-28

## 2025-05-28 DIAGNOSIS — R07.9 CHEST PAIN, UNSPECIFIED TYPE: ICD-10-CM

## 2025-05-28 NOTE — TELEPHONE ENCOUNTER
----- Message -----   From: Emma Ram   Sent: 5/28/2025  12:10 PM EDT   To: [facility]   Subject: 5/29 CT CHEST DENIED. P2P NEEDED                 The prior authorization request for this study has not been approved. You can schedule a peer to peer by calling [employer] [phone] with the deadline date of 6/4. The insurance determined the following:     CASE#[id number]       [X ] Does not meet Medical Necessity- NOT ENOUGH DOCUMENTATION IN OFFICE NOTE   [ ] No prior imaging   [ ] PT or conservative treatment incomplete   [ ] Missing Labs   [ ] Frequency     If you choose not to complete a peer to peer then please reply to this message to let us know. Please notify your patient and contact central scheduling by calling [phone] to cancel the appointment and cancel the order in Epic.      Preliminary Clinical Rationale     We reviewed the medical information given by your doctor. Your doctor's records say you have chest pain.   With what was received from your doctor, a decision was made that this is not a reason for a(n) Chest CT.     To approve, we need notes from your doctor explaining why you need this test. The notes we received didn't tell us enough about your problem or why this test is needed. We want to know more about the problem you are having and how this test will help your doctor decide the best way to treat you. It is our medical view that the Chest CT be denied as it is not medically necessary. [employer] Clinical Guideline 020 for Chest ([place]or[place]) CT was used in this request.

## 2025-05-29 ENCOUNTER — TELEPHONE (OUTPATIENT)
Dept: INTERNAL MEDICINE CLINIC | Facility: OTHER | Age: 40
End: 2025-05-29

## 2025-05-29 DIAGNOSIS — R07.9 CHEST PAIN, UNSPECIFIED TYPE: Primary | ICD-10-CM

## 2025-05-29 NOTE — TELEPHONE ENCOUNTER
From: Emma Ram   Sent: 5/28/2025  12:10 PM EDT   To: [facility]   Subject: 5/29 CT CHEST DENIED. P2P NEEDED                 The prior authorization request for this study has not been approved. You can schedule a peer to peer by calling [email or web address] [phone] with the deadline date of 6/4. The insurance determined the following:     CASE#[id number]       [X ] Does not meet Medical Necessity- NOT ENOUGH DOCUMENTATION IN OFFICE NOTE   [ ] No prior imaging   [ ] PT or conservative treatment incomplete   [ ] Missing Labs   [ ] Frequency     If you choose not to complete a peer to peer then please reply to this message to let us know. Please notify your patient and contact central scheduling by calling [phone] to cancel the appointment and cancel the order in Epic.      Preliminary Clinical Rationale     We reviewed the medical information given by your doctor. Your doctor's records say you have chest pain.   With what was received from your doctor, a decision was made that this is not a reason for a(n) Chest CT.     To approve, we need notes from your doctor explaining why you need this test. The notes we received didn't tell us enough about your problem or why this test is needed. We want to know more about the problem you are having and how this test will help your doctor decide the best way to treat you. It is our medical view that the Chest CT be denied as it is not medically necessary. [employer] Clinical Guideline 020 for Chest (Thorax) CT was used in this request.       Thank you.

## 2025-05-29 NOTE — TELEPHONE ENCOUNTER
Has a history of cardiac disease in her family.  I am going to order a stress test.  Please let her know that I am ordering one.

## 2025-05-30 NOTE — TELEPHONE ENCOUNTER
I spoke with patient and given her SOB on exertion and Hx of heart disease in her family, I recommended a stress testni

## 2025-06-02 NOTE — TELEPHONE ENCOUNTER
Spoke with patient, she will call to schedule ECHO stress test. Central scheduling phone number given.

## 2025-06-03 NOTE — TELEPHONE ENCOUNTER
Called and spoke to patient.  Gave her friendly reminder to schedule stress test again and to get labs done.  She was happy for reminder and will do it today

## 2025-06-17 ENCOUNTER — RESULTS FOLLOW-UP (OUTPATIENT)
Dept: OBGYN CLINIC | Facility: MEDICAL CENTER | Age: 40
End: 2025-06-17

## 2025-06-19 ENCOUNTER — PATIENT MESSAGE (OUTPATIENT)
Age: 40
End: 2025-06-19

## 2025-06-20 ENCOUNTER — TELEPHONE (OUTPATIENT)
Age: 40
End: 2025-06-20

## 2025-06-20 ENCOUNTER — HOSPITAL ENCOUNTER (OUTPATIENT)
Dept: NON INVASIVE DIAGNOSTICS | Facility: HOSPITAL | Age: 40
Discharge: HOME | End: 2025-06-20
Attending: INTERNAL MEDICINE
Payer: COMMERCIAL

## 2025-06-20 DIAGNOSIS — R07.9 CHEST PAIN, UNSPECIFIED TYPE: ICD-10-CM

## 2025-06-20 LAB
AORTIC VALVE MEAN VELOCITY: 10.3 M/S
AV AREA BY CONTINUOUS VTI: 1.8 CM2
AV AREA PEAK VELOCITY: 1.8 CM2
AV LVOT MEAN GRADIENT: 2 MMHG
AV LVOT PEAK GRADIENT: 3 MMHG
AV MEAN PRESS GRAD SYS DOP V1V2: 5 MMHG
AV ORIFICE AREA US: 2.01 CM2
AV PEAK GRADIENT: 8 MMHG
AV VELOCITY RATIO: 0.64
AV VMAX SYS DOP: 1.42 M/S
CHEST PAIN STATEMENT: NORMAL
DOP CALC AO VTI: 30.4 CM
DOP CALC LVOT AREA: 3.14 CM2
DOP CALC LVOT CARDIAC INDEX: 2.09 L/MIN/M2
DOP CALC LVOT CARDIAC OUTPUT: 4.25 L/MIN
DOP CALC LVOT DIAMETER: 2 CM
DOP CALC LVOT PEAK VEL VTI: 19.5 CM
DOP CALC LVOT PEAK VEL: 0.92 M/S
DOP CALC LVOT STROKE INDEX: 26.6 ML/M2
DOP CALC LVOT STROKE VOLUME: 61.23 CM3
E WAVE DECELERATION TIME: 291 MS
E/A RATIO: 0.98
LAAS-AP4: 17.5 CM2
LV EF US.2D.A4C+ESTIMATED: 68 %
MAX DIASTOLIC BP: 66 MMHG
MAX HR PERCENT: 89 %
MAX HR: 162 BPM
MAX PREDICTED HEART RATE: 182 BPM
MV E'TISSUE VEL-LAT: 15 CM/S
MV E'TISSUE VEL-SEP: 10 CM/S
MV PEAK A VEL: 0.64 M/S
MV PEAK E VEL: 63 CM/S
PROTOCOL NAME: NORMAL
RA PRESSURE ESTIMATED: 3 MMHG
RATE PRESSURE PRODUCT: NORMAL
REASON FOR TERMINATION: NORMAL
RIGHT ATRIAL 2D VOLUME: 39 ML
RIGHT ATRIUM AREA SYSTOLE A4C: 15 CM2
RIGHT VENTRICLE ID DIMENSION: 3.4 CM
SL CV LV EF: 65
SL CV STRESS RECOVERY BP: NORMAL MMHG
SL CV STRESS RECOVERY HR: 94 BPM
SL CV STRESS RECOVERY O2 SAT: 98 %
SL CV STRESS STAGE REACHED: 3
STRESS ANGINA INDEX: 0
STRESS BASELINE BP: NORMAL MMHG
STRESS BASELINE HR: 95 BPM
STRESS O2 SAT REST: 98 %
STRESS PEAK HR: 162 BPM
STRESS POST ESTIMATED WORKLOAD: 9.3 METS
STRESS POST EXERCISE DUR MIN: 7 MIN
STRESS POST EXERCISE DUR MIN: 7 MIN
STRESS POST EXERCISE DUR SEC: 30 SEC
STRESS POST EXERCISE DUR SEC: 30 SEC
STRESS POST O2 SAT PEAK: 99 %
STRESS POST PEAK BP: 152 MMHG
STRESS POST PEAK HR: 162 BPM
STRESS POST PEAK SYSTOLIC BP: 152 MMHG
TARGET HR FORMULA: NORMAL
TEST INDICATION: NORMAL
TRICUSPID ANNULAR PLANE SYSTOLIC EXCURSION: 2.3 CM

## 2025-06-20 PROCEDURE — 93350 STRESS TTE ONLY: CPT | Performed by: STUDENT IN AN ORGANIZED HEALTH CARE EDUCATION/TRAINING PROGRAM

## 2025-06-20 PROCEDURE — 93350 STRESS TTE ONLY: CPT

## 2025-06-20 NOTE — PATIENT COMMUNICATION
Patient would need excuse letter for her today's appointment with Cardiology since she was sent to them by her PCP and she will also need excuse letter for Monday as well when she scheduled for her Rash. Please place excuse letter in the chart and advise patient accordingly.  Thank you!!

## 2025-06-23 LAB
CHEST PAIN STATEMENT: NORMAL
MAX DIASTOLIC BP: 66 MMHG
MAX PREDICTED HEART RATE: 182 BPM
PROTOCOL NAME: NORMAL
REASON FOR TERMINATION: NORMAL
STRESS POST EXERCISE DUR MIN: 7 MIN
STRESS POST EXERCISE DUR SEC: 30 SEC
STRESS POST PEAK HR: 162 BPM
STRESS POST PEAK SYSTOLIC BP: 152 MMHG
TARGET HR FORMULA: NORMAL
TEST INDICATION: NORMAL

## (undated) DEVICE — CYSTO TUBING SINGLE IRRIGATION

## (undated) DEVICE — SUT STRATAFIX SPIRAL 2-0 CT-1 30 CM SXPP1B410

## (undated) DEVICE — COLUMN DRAPE

## (undated) DEVICE — CHLORAPREP HI-LITE 26ML ORANGE

## (undated) DEVICE — ARM DRAPE

## (undated) DEVICE — KIT, BETHLEHEM ROBOTIC PROST: Brand: CARDINAL HEALTH

## (undated) DEVICE — LARGE NEEDLE DRIVER: Brand: ENDOWRIST

## (undated) DEVICE — GLOVE PI ULTRA TOUCH SZ.7.5

## (undated) DEVICE — PVC URETHRAL CATHETER: Brand: DOVER

## (undated) DEVICE — TRAY FOLEY 16FR URIMETER SILICONE SURESTEP

## (undated) DEVICE — GLOVE PI ULTRA TOUCH SZ.6.5

## (undated) DEVICE — GLOVE INDICATOR PI UNDERGLOVE SZ 7 BLUE

## (undated) DEVICE — GLOVE INDICATOR PI UNDERGLOVE SZ 8 BLUE

## (undated) DEVICE — TIP COVER ACCESSORY

## (undated) DEVICE — GLOVE INDICATOR PI UNDERGLOVE SZ 7.5 BLUE

## (undated) DEVICE — IV EXTENSION TUBING 33 IN

## (undated) DEVICE — OCCLUDER COLPO-PNEUMO

## (undated) DEVICE — AIRSEAL TUBE SMOKE EVAC LUMENX3 FILTERED

## (undated) DEVICE — INTENDED FOR TISSUE SEPARATION, AND OTHER PROCEDURES THAT REQUIRE A SHARP SURGICAL BLADE TO PUNCTURE OR CUT.: Brand: BARD-PARKER SAFETY BLADES SIZE 15, STERILE

## (undated) DEVICE — TISSUE REMOVAL SYSTEM RESECTING DEVICE: Brand: SYMPHION

## (undated) DEVICE — PREMIUM DRY TRAY LF: Brand: MEDLINE INDUSTRIES, INC.

## (undated) DEVICE — UNDER BUTTOCKS DRAPE W/FLUID CONTROL POUCH: Brand: CONVERTORS

## (undated) DEVICE — INTENDED FOR TISSUE SEPARATION, AND OTHER PROCEDURES THAT REQUIRE A SHARP SURGICAL BLADE TO PUNCTURE OR CUT.: Brand: BARD-PARKER SAFETY BLADES SIZE 11, STERILE

## (undated) DEVICE — STERILE 8 INCH PROCTO SWAB: Brand: CARDINAL HEALTH

## (undated) DEVICE — VESSEL SEALER EXTEND: Brand: ENDOWRIST

## (undated) DEVICE — NEEDLE 25G X 1 1/2

## (undated) DEVICE — SCD SEQUENTIAL COMPRESSION COMFORT SLEEVE MEDIUM KNEE LENGTH: Brand: KENDALL SCD

## (undated) DEVICE — BLADELESS OBTURATOR: Brand: WECK VISTA

## (undated) DEVICE — TISSUE REMOVAL SYSTEM FLUID MANAGEMENT ACCESSORIES: Brand: SYMPHION

## (undated) DEVICE — LUBRICANT INST ELECTROLUBE ANTISTK WO PAD

## (undated) DEVICE — GLOVE INDICATOR PI UNDERGLOVE SZ 6.5 BLUE

## (undated) DEVICE — STERILE CYSTO PACK: Brand: CARDINAL HEALTH

## (undated) DEVICE — CANNULA SEAL

## (undated) DEVICE — VISUALIZATION SYSTEM: Brand: CLEARIFY

## (undated) DEVICE — ADHESIVE SKIN HIGH VISCOSITY EXOFIN 1ML

## (undated) DEVICE — SUT MONOCRYL 4-0 PS-2 27 IN Y426H

## (undated) DEVICE — MAYO STAND COVER: Brand: CONVERTORS

## (undated) DEVICE — GLOVE PI ULTRA TOUCH SZ.7.0

## (undated) DEVICE — STRL ALLENTOWN HYSTEROSCOPY PK: Brand: CARDINAL HEALTH

## (undated) DEVICE — TROCAR PORT ACCESS 5 X120MML W/BLDLS OPTICAL TIP AIRSEAL

## (undated) DEVICE — FENESTRATED BIPOLAR FORCEPS: Brand: ENDOWRIST

## (undated) DEVICE — MONOPOLAR CURVED SCISSORS: Brand: ENDOWRIST

## (undated) DEVICE — 1820 FOAM BLOCK NEEDLE COUNTER: Brand: DEVON

## (undated) DEVICE — CHLORHEXIDINE 4PCT 4 OZ

## (undated) DEVICE — 40595 XL TRENDELENBURG POSITIONING KIT: Brand: 40595 XL TRENDELENBURG POSITIONING KIT

## (undated) DEVICE — 2000CC GUARDIAN II: Brand: GUARDIAN

## (undated) DEVICE — UTERINE MANIPULATOR RUMI 6.7 X 8 CM

## (undated) DEVICE — SYRINGE 50ML LL

## (undated) DEVICE — PROGRASP FORCEPS: Brand: ENDOWRIST

## (undated) DEVICE — ANTIBACTERIAL VIOLET BRAIDED (POLYGLACTIN 910), SYNTHETIC ABSORBABLE SUTURE: Brand: COATED VICRYL